# Patient Record
Sex: MALE | Race: WHITE | URBAN - METROPOLITAN AREA
[De-identification: names, ages, dates, MRNs, and addresses within clinical notes are randomized per-mention and may not be internally consistent; named-entity substitution may affect disease eponyms.]

---

## 2024-09-13 ENCOUNTER — TELEPHONE (OUTPATIENT)
Dept: CARDIOTHORACIC SURGERY | Facility: MEDICAL CENTER | Age: 64
End: 2024-09-13
Payer: COMMERCIAL

## 2024-09-13 ENCOUNTER — HOSPITAL ENCOUNTER (OUTPATIENT)
Dept: RADIOLOGY | Facility: MEDICAL CENTER | Age: 64
End: 2024-09-13
Payer: COMMERCIAL

## 2024-09-16 ENCOUNTER — TELEPHONE (OUTPATIENT)
Dept: CARDIOTHORACIC SURGERY | Facility: MEDICAL CENTER | Age: 64
End: 2024-09-16
Payer: COMMERCIAL

## 2024-09-18 ENCOUNTER — TELEPHONE (OUTPATIENT)
Dept: CARDIOTHORACIC SURGERY | Facility: MEDICAL CENTER | Age: 64
End: 2024-09-18
Payer: COMMERCIAL

## 2024-10-03 ENCOUNTER — TELEPHONE (OUTPATIENT)
Dept: CARDIOTHORACIC SURGERY | Facility: MEDICAL CENTER | Age: 64
End: 2024-10-03
Payer: COMMERCIAL

## 2024-10-22 ENCOUNTER — HOSPITAL ENCOUNTER (OUTPATIENT)
Dept: RADIOLOGY | Facility: MEDICAL CENTER | Age: 64
End: 2024-10-22
Payer: COMMERCIAL

## 2024-10-22 ENCOUNTER — HOSPITAL ENCOUNTER (OUTPATIENT)
Dept: RADIOLOGY | Facility: MEDICAL CENTER | Age: 64
End: 2024-10-22
Attending: INTERNAL MEDICINE
Payer: COMMERCIAL

## 2024-10-31 ENCOUNTER — OFFICE VISIT (OUTPATIENT)
Dept: CARDIOTHORACIC SURGERY | Facility: MEDICAL CENTER | Age: 64
End: 2024-10-31
Payer: COMMERCIAL

## 2024-10-31 VITALS
HEART RATE: 75 BPM | TEMPERATURE: 97.9 F | SYSTOLIC BLOOD PRESSURE: 132 MMHG | OXYGEN SATURATION: 97 % | HEIGHT: 67 IN | DIASTOLIC BLOOD PRESSURE: 82 MMHG | WEIGHT: 217 LBS | BODY MASS INDEX: 34.06 KG/M2

## 2024-10-31 DIAGNOSIS — I71.21 ANEURYSM OF ASCENDING AORTA WITHOUT RUPTURE (HCC): ICD-10-CM

## 2024-10-31 RX ORDER — FAMOTIDINE 20 MG/1
20 TABLET, FILM COATED ORAL
COMMUNITY

## 2024-10-31 RX ORDER — IBUPROFEN 200 MG
200 TABLET ORAL
COMMUNITY

## 2024-10-31 RX ORDER — PSYLLIUM HUSK 0.4 G
CAPSULE ORAL
COMMUNITY

## 2024-10-31 RX ORDER — MULTIVITAMIN,THERAPEUTIC
1 TABLET ORAL DAILY
COMMUNITY

## 2024-10-31 RX ORDER — LISINOPRIL 40 MG/1
1 TABLET ORAL DAILY
COMMUNITY
Start: 2024-08-05

## 2024-10-31 ASSESSMENT — ENCOUNTER SYMPTOMS
STRIDOR: 0
CONSTIPATION: 0
DOUBLE VISION: 0
DIAPHORESIS: 0
HALLUCINATIONS: 0
HEARTBURN: 0
SPEECH CHANGE: 0
MYALGIAS: 0
FLANK PAIN: 0
EYE PAIN: 0
PALPITATIONS: 0
SPUTUM PRODUCTION: 0
NAUSEA: 0
POLYDIPSIA: 0
WHEEZING: 0
VOMITING: 0
ORTHOPNEA: 0
WEIGHT LOSS: 0
FOCAL WEAKNESS: 0
HEMOPTYSIS: 0
HEADACHES: 0
BLURRED VISION: 0
EYE DISCHARGE: 0
SEIZURES: 0
BRUISES/BLEEDS EASILY: 0
DEPRESSION: 0
ABDOMINAL PAIN: 0
SORE THROAT: 0
COUGH: 0
WEAKNESS: 0
NECK PAIN: 0
CHILLS: 0
DIZZINESS: 0
FEVER: 0

## 2024-10-31 ASSESSMENT — LIFESTYLE VARIABLES: SUBSTANCE_ABUSE: 0

## 2024-11-07 ENCOUNTER — TELEPHONE (OUTPATIENT)
Dept: HEALTH INFORMATION MANAGEMENT | Facility: OTHER | Age: 64
End: 2024-11-07
Payer: COMMERCIAL

## 2024-11-07 ENCOUNTER — DOCUMENTATION (OUTPATIENT)
Dept: CARDIOLOGY | Facility: MEDICAL CENTER | Age: 64
End: 2024-11-07
Payer: COMMERCIAL

## 2024-12-02 ENCOUNTER — DOCUMENTATION (OUTPATIENT)
Dept: VASCULAR LAB | Facility: MEDICAL CENTER | Age: 64
End: 2024-12-02
Payer: COMMERCIAL

## 2024-12-03 NOTE — PROGRESS NOTES
Patient referred to vascular care  Unfortunately when respire schedulers patient refused to make an appointment.  Unless patient establishes with a face-to-face visit in our office will be unable to take part in care  Await further patient contact.  Pending further contact, we will defer all further management of vascular disease and its risk factors to PCP and/or referring MD.    Will ask nurse navigator to contact patient and see if they would be willing to reconsider    Michael J. Bloch, MD  Vascular Care    cc:     OSCAR Arrieta

## 2024-12-06 ENCOUNTER — TELEPHONE (OUTPATIENT)
Dept: VASCULAR LAB | Facility: MEDICAL CENTER | Age: 64
End: 2024-12-06
Payer: COMMERCIAL

## 2024-12-06 NOTE — TELEPHONE ENCOUNTER
I called pt, he states he does not want to establish care and is going to be meeting with his oncologist to discuss options. I did let him know if he changes his mind we are here and could even look at doing a VV.     Declines scheduling at this time.

## 2025-04-04 ENCOUNTER — HOSPITAL ENCOUNTER (OUTPATIENT)
Dept: RADIOLOGY | Facility: MEDICAL CENTER | Age: 65
End: 2025-04-04

## 2025-04-04 ENCOUNTER — APPOINTMENT (OUTPATIENT)
Dept: RADIOLOGY | Facility: MEDICAL CENTER | Age: 65
DRG: 287 | End: 2025-04-04
Attending: STUDENT IN AN ORGANIZED HEALTH CARE EDUCATION/TRAINING PROGRAM
Payer: COMMERCIAL

## 2025-04-04 ENCOUNTER — APPOINTMENT (OUTPATIENT)
Dept: CARDIOLOGY | Facility: MEDICAL CENTER | Age: 65
DRG: 287 | End: 2025-04-04
Payer: COMMERCIAL

## 2025-04-04 ENCOUNTER — HOSPITAL ENCOUNTER (INPATIENT)
Facility: MEDICAL CENTER | Age: 65
LOS: 2 days | DRG: 287 | End: 2025-04-06
Admitting: STUDENT IN AN ORGANIZED HEALTH CARE EDUCATION/TRAINING PROGRAM
Payer: COMMERCIAL

## 2025-04-04 DIAGNOSIS — M25.511 ACUTE PAIN OF RIGHT SHOULDER: ICD-10-CM

## 2025-04-04 DIAGNOSIS — I21.4 NSTEMI (NON-ST ELEVATED MYOCARDIAL INFARCTION) (HCC): ICD-10-CM

## 2025-04-04 PROBLEM — I10 HYPERTENSION: Status: ACTIVE | Noted: 2025-04-04

## 2025-04-04 PROBLEM — K21.9 GERD (GASTROESOPHAGEAL REFLUX DISEASE): Status: ACTIVE | Noted: 2025-04-04

## 2025-04-04 PROBLEM — C61 PROSTATE CANCER (HCC): Status: ACTIVE | Noted: 2025-04-04

## 2025-04-04 PROBLEM — E78.5 HYPERLIPIDEMIA LDL GOAL <70: Status: ACTIVE | Noted: 2025-04-04

## 2025-04-04 LAB
ALBUMIN SERPL BCP-MCNC: 4.2 G/DL (ref 3.2–4.9)
ALBUMIN/GLOB SERPL: 1.8 G/DL
ALP SERPL-CCNC: 91 U/L (ref 30–99)
ALT SERPL-CCNC: 27 U/L (ref 2–50)
ANION GAP SERPL CALC-SCNC: 12 MMOL/L (ref 7–16)
APPEARANCE UR: CLEAR
APTT PPP: 30.4 SEC (ref 24.7–36)
AST SERPL-CCNC: 22 U/L (ref 12–45)
BACTERIA #/AREA URNS HPF: NORMAL /HPF
BASOPHILS # BLD AUTO: 0.9 % (ref 0–1.8)
BASOPHILS # BLD: 0.07 K/UL (ref 0–0.12)
BILIRUB SERPL-MCNC: 0.5 MG/DL (ref 0.1–1.5)
BILIRUB UR QL STRIP.AUTO: NEGATIVE
BUN SERPL-MCNC: 17 MG/DL (ref 8–22)
CALCIUM ALBUM COR SERPL-MCNC: 8.9 MG/DL (ref 8.5–10.5)
CALCIUM SERPL-MCNC: 9.1 MG/DL (ref 8.5–10.5)
CASTS URNS QL MICRO: NORMAL /LPF (ref 0–2)
CHLORIDE SERPL-SCNC: 106 MMOL/L (ref 96–112)
CHOLEST SERPL-MCNC: 164 MG/DL (ref 100–199)
CK SERPL-CCNC: 172 U/L (ref 0–154)
CO2 SERPL-SCNC: 21 MMOL/L (ref 20–33)
COLOR UR: YELLOW
CORTIS SERPL-MCNC: 14.9 UG/DL (ref 0–23)
CREAT SERPL-MCNC: 1.12 MG/DL (ref 0.5–1.4)
EKG IMPRESSION: NORMAL
EOSINOPHIL # BLD AUTO: 0.04 K/UL (ref 0–0.51)
EOSINOPHIL NFR BLD: 0.5 % (ref 0–6.9)
EPITHELIAL CELLS 1715: NORMAL /HPF (ref 0–5)
ERYTHROCYTE [DISTWIDTH] IN BLOOD BY AUTOMATED COUNT: 45.7 FL (ref 35.9–50)
EST. AVERAGE GLUCOSE BLD GHB EST-MCNC: 128 MG/DL
GFR SERPLBLD CREATININE-BSD FMLA CKD-EPI: 73 ML/MIN/1.73 M 2
GLOBULIN SER CALC-MCNC: 2.3 G/DL (ref 1.9–3.5)
GLUCOSE SERPL-MCNC: 114 MG/DL (ref 65–99)
GLUCOSE UR STRIP.AUTO-MCNC: NEGATIVE MG/DL
HBA1C MFR BLD: 6.1 % (ref 4–5.6)
HCT VFR BLD AUTO: 43.2 % (ref 42–52)
HDLC SERPL-MCNC: 64 MG/DL
HGB BLD-MCNC: 14.2 G/DL (ref 14–18)
HOLDING TUBE BB 8507: NORMAL
IMM GRANULOCYTES # BLD AUTO: 0.03 K/UL (ref 0–0.11)
IMM GRANULOCYTES NFR BLD AUTO: 0.4 % (ref 0–0.9)
INR PPP: 1.04 (ref 0.87–1.13)
KETONES UR STRIP.AUTO-MCNC: 15 MG/DL
LACTATE SERPL-SCNC: 2.2 MMOL/L (ref 0.5–2)
LDLC SERPL CALC-MCNC: 82 MG/DL
LEUKOCYTE ESTERASE UR QL STRIP.AUTO: NEGATIVE
LV EJECT FRACT  99904: 65
LV EJECT FRACT MOD 2C 99903: 67.37
LV EJECT FRACT MOD 4C 99902: 70
LV EJECT FRACT MOD BP 99901: 69.83
LYMPHOCYTES # BLD AUTO: 1.9 K/UL (ref 1–4.8)
LYMPHOCYTES NFR BLD: 23.3 % (ref 22–41)
MAGNESIUM SERPL-MCNC: 2 MG/DL (ref 1.5–2.5)
MCH RBC QN AUTO: 28.5 PG (ref 27–33)
MCHC RBC AUTO-ENTMCNC: 32.9 G/DL (ref 32.3–36.5)
MCV RBC AUTO: 86.7 FL (ref 81.4–97.8)
MICRO URNS: ABNORMAL
MONOCYTES # BLD AUTO: 0.57 K/UL (ref 0–0.85)
MONOCYTES NFR BLD AUTO: 7 % (ref 0–13.4)
NEUTROPHILS # BLD AUTO: 5.54 K/UL (ref 1.82–7.42)
NEUTROPHILS NFR BLD: 67.9 % (ref 44–72)
NITRITE UR QL STRIP.AUTO: NEGATIVE
NRBC # BLD AUTO: 0 K/UL
NRBC BLD-RTO: 0 /100 WBC (ref 0–0.2)
NT-PROBNP SERPL IA-MCNC: 237 PG/ML (ref 0–125)
PH UR STRIP.AUTO: 7 [PH] (ref 5–8)
PHOSPHATE SERPL-MCNC: 3.6 MG/DL (ref 2.5–4.5)
PLATELET # BLD AUTO: 277 K/UL (ref 164–446)
PMV BLD AUTO: 10.4 FL (ref 9–12.9)
POTASSIUM SERPL-SCNC: 4.7 MMOL/L (ref 3.6–5.5)
PROCALCITONIN SERPL-MCNC: 0.05 NG/ML
PROT SERPL-MCNC: 6.5 G/DL (ref 6–8.2)
PROT UR QL STRIP: 30 MG/DL
PROTHROMBIN TIME: 13.6 SEC (ref 12–14.6)
RBC # BLD AUTO: 4.98 M/UL (ref 4.7–6.1)
RBC # URNS HPF: NORMAL /HPF (ref 0–2)
RBC UR QL AUTO: NEGATIVE
SODIUM SERPL-SCNC: 139 MMOL/L (ref 135–145)
SP GR UR STRIP.AUTO: >1.045
TRIGL SERPL-MCNC: 89 MG/DL (ref 0–149)
TROPONIN T SERPL-MCNC: 8 NG/L (ref 6–19)
UFH PPP CHRO-ACNC: 0.66 IU/ML
UROBILINOGEN UR STRIP.AUTO-MCNC: 1 EU/DL
WBC # BLD AUTO: 8.2 K/UL (ref 4.8–10.8)
WBC #/AREA URNS HPF: NORMAL /HPF

## 2025-04-04 PROCEDURE — 700111 HCHG RX REV CODE 636 W/ 250 OVERRIDE (IP)

## 2025-04-04 PROCEDURE — 83036 HEMOGLOBIN GLYCOSYLATED A1C: CPT

## 2025-04-04 PROCEDURE — 99291 CRITICAL CARE FIRST HOUR: CPT | Performed by: STUDENT IN AN ORGANIZED HEALTH CARE EDUCATION/TRAINING PROGRAM

## 2025-04-04 PROCEDURE — B2111ZZ FLUOROSCOPY OF MULTIPLE CORONARY ARTERIES USING LOW OSMOLAR CONTRAST: ICD-10-PCS | Performed by: INTERNAL MEDICINE

## 2025-04-04 PROCEDURE — 82533 TOTAL CORTISOL: CPT

## 2025-04-04 PROCEDURE — 700102 HCHG RX REV CODE 250 W/ 637 OVERRIDE(OP)

## 2025-04-04 PROCEDURE — 93454 CORONARY ARTERY ANGIO S&I: CPT | Mod: 26 | Performed by: INTERNAL MEDICINE

## 2025-04-04 PROCEDURE — 71045 X-RAY EXAM CHEST 1 VIEW: CPT

## 2025-04-04 PROCEDURE — 770020 HCHG ROOM/CARE - TELE (206)

## 2025-04-04 PROCEDURE — 81001 URINALYSIS AUTO W/SCOPE: CPT

## 2025-04-04 PROCEDURE — 85520 HEPARIN ASSAY: CPT

## 2025-04-04 PROCEDURE — B3101ZZ FLUOROSCOPY OF THORACIC AORTA USING LOW OSMOLAR CONTRAST: ICD-10-PCS | Performed by: INTERNAL MEDICINE

## 2025-04-04 PROCEDURE — 99223 1ST HOSP IP/OBS HIGH 75: CPT | Mod: 25 | Performed by: INTERNAL MEDICINE

## 2025-04-04 PROCEDURE — 85730 THROMBOPLASTIN TIME PARTIAL: CPT

## 2025-04-04 PROCEDURE — 82550 ASSAY OF CK (CPK): CPT

## 2025-04-04 PROCEDURE — 85610 PROTHROMBIN TIME: CPT

## 2025-04-04 PROCEDURE — 80053 COMPREHEN METABOLIC PANEL: CPT

## 2025-04-04 PROCEDURE — 93306 TTE W/DOPPLER COMPLETE: CPT | Mod: 26 | Performed by: INTERNAL MEDICINE

## 2025-04-04 PROCEDURE — 83880 ASSAY OF NATRIURETIC PEPTIDE: CPT

## 2025-04-04 PROCEDURE — A9270 NON-COVERED ITEM OR SERVICE: HCPCS | Performed by: INTERNAL MEDICINE

## 2025-04-04 PROCEDURE — 84484 ASSAY OF TROPONIN QUANT: CPT

## 2025-04-04 PROCEDURE — 84145 PROCALCITONIN (PCT): CPT

## 2025-04-04 PROCEDURE — A9270 NON-COVERED ITEM OR SERVICE: HCPCS | Performed by: STUDENT IN AN ORGANIZED HEALTH CARE EDUCATION/TRAINING PROGRAM

## 2025-04-04 PROCEDURE — 83605 ASSAY OF LACTIC ACID: CPT

## 2025-04-04 PROCEDURE — 700111 HCHG RX REV CODE 636 W/ 250 OVERRIDE (IP): Mod: JZ | Performed by: STUDENT IN AN ORGANIZED HEALTH CARE EDUCATION/TRAINING PROGRAM

## 2025-04-04 PROCEDURE — 93306 TTE W/DOPPLER COMPLETE: CPT

## 2025-04-04 PROCEDURE — 700102 HCHG RX REV CODE 250 W/ 637 OVERRIDE(OP): Performed by: STUDENT IN AN ORGANIZED HEALTH CARE EDUCATION/TRAINING PROGRAM

## 2025-04-04 PROCEDURE — 83735 ASSAY OF MAGNESIUM: CPT

## 2025-04-04 PROCEDURE — 36415 COLL VENOUS BLD VENIPUNCTURE: CPT

## 2025-04-04 PROCEDURE — 84100 ASSAY OF PHOSPHORUS: CPT

## 2025-04-04 PROCEDURE — 700102 HCHG RX REV CODE 250 W/ 637 OVERRIDE(OP): Performed by: INTERNAL MEDICINE

## 2025-04-04 PROCEDURE — 80061 LIPID PANEL: CPT

## 2025-04-04 PROCEDURE — 85025 COMPLETE CBC W/AUTO DIFF WBC: CPT

## 2025-04-04 PROCEDURE — A9270 NON-COVERED ITEM OR SERVICE: HCPCS

## 2025-04-04 PROCEDURE — 93005 ELECTROCARDIOGRAM TRACING: CPT | Mod: TC | Performed by: STUDENT IN AN ORGANIZED HEALTH CARE EDUCATION/TRAINING PROGRAM

## 2025-04-04 PROCEDURE — 700117 HCHG RX CONTRAST REV CODE 255: Performed by: INTERNAL MEDICINE

## 2025-04-04 PROCEDURE — 93567 NJX CAR CTH SPRVLV AORTGRPHY: CPT | Performed by: INTERNAL MEDICINE

## 2025-04-04 PROCEDURE — 99152 MOD SED SAME PHYS/QHP 5/>YRS: CPT | Performed by: INTERNAL MEDICINE

## 2025-04-04 PROCEDURE — 700101 HCHG RX REV CODE 250

## 2025-04-04 PROCEDURE — 99153 MOD SED SAME PHYS/QHP EA: CPT

## 2025-04-04 PROCEDURE — 93010 ELECTROCARDIOGRAM REPORT: CPT | Performed by: INTERNAL MEDICINE

## 2025-04-04 RX ORDER — MIDAZOLAM HYDROCHLORIDE 1 MG/ML
INJECTION INTRAMUSCULAR; INTRAVENOUS
Status: COMPLETED
Start: 2025-04-04 | End: 2025-04-04

## 2025-04-04 RX ORDER — METOPROLOL SUCCINATE 25 MG/1
25 TABLET, EXTENDED RELEASE ORAL
Status: DISCONTINUED | OUTPATIENT
Start: 2025-04-04 | End: 2025-04-06 | Stop reason: HOSPADM

## 2025-04-04 RX ORDER — LISINOPRIL 20 MG/1
40 TABLET ORAL DAILY
Status: DISCONTINUED | OUTPATIENT
Start: 2025-04-04 | End: 2025-04-06 | Stop reason: HOSPADM

## 2025-04-04 RX ORDER — MORPHINE SULFATE 4 MG/ML
2-4 INJECTION INTRAVENOUS
Status: DISCONTINUED | OUTPATIENT
Start: 2025-04-04 | End: 2025-04-05

## 2025-04-04 RX ORDER — ASPIRIN 81 MG/1
81 TABLET ORAL DAILY
Status: DISCONTINUED | OUTPATIENT
Start: 2025-04-05 | End: 2025-04-06 | Stop reason: HOSPADM

## 2025-04-04 RX ORDER — FAMOTIDINE 20 MG/1
20 TABLET, FILM COATED ORAL 2 TIMES DAILY
Status: DISCONTINUED | OUTPATIENT
Start: 2025-04-04 | End: 2025-04-06 | Stop reason: HOSPADM

## 2025-04-04 RX ORDER — HYDROMORPHONE HYDROCHLORIDE 1 MG/ML
0.5 INJECTION, SOLUTION INTRAMUSCULAR; INTRAVENOUS; SUBCUTANEOUS ONCE
Status: COMPLETED | OUTPATIENT
Start: 2025-04-04 | End: 2025-04-04

## 2025-04-04 RX ORDER — VERAPAMIL HYDROCHLORIDE 2.5 MG/ML
INJECTION INTRAVENOUS
Status: COMPLETED
Start: 2025-04-04 | End: 2025-04-04

## 2025-04-04 RX ORDER — LABETALOL HYDROCHLORIDE 5 MG/ML
10 INJECTION, SOLUTION INTRAVENOUS EVERY 4 HOURS PRN
Status: DISCONTINUED | OUTPATIENT
Start: 2025-04-04 | End: 2025-04-06 | Stop reason: HOSPADM

## 2025-04-04 RX ORDER — HYDROMORPHONE HYDROCHLORIDE 1 MG/ML
0.5 INJECTION, SOLUTION INTRAMUSCULAR; INTRAVENOUS; SUBCUTANEOUS
Status: COMPLETED | OUTPATIENT
Start: 2025-04-04 | End: 2025-04-05

## 2025-04-04 RX ORDER — PROCHLORPERAZINE EDISYLATE 5 MG/ML
5-10 INJECTION INTRAMUSCULAR; INTRAVENOUS EVERY 4 HOURS PRN
Status: DISCONTINUED | OUTPATIENT
Start: 2025-04-04 | End: 2025-04-06 | Stop reason: HOSPADM

## 2025-04-04 RX ORDER — PROMETHAZINE HYDROCHLORIDE 25 MG/1
12.5-25 TABLET ORAL EVERY 4 HOURS PRN
Status: DISCONTINUED | OUTPATIENT
Start: 2025-04-04 | End: 2025-04-06 | Stop reason: HOSPADM

## 2025-04-04 RX ORDER — ATORVASTATIN CALCIUM 40 MG/1
40 TABLET, FILM COATED ORAL EVERY EVENING
Status: DISCONTINUED | OUTPATIENT
Start: 2025-04-04 | End: 2025-04-06 | Stop reason: HOSPADM

## 2025-04-04 RX ORDER — NITROGLYCERIN 0.4 MG/1
0.4 TABLET SUBLINGUAL
Status: DISCONTINUED | OUTPATIENT
Start: 2025-04-04 | End: 2025-04-06 | Stop reason: HOSPADM

## 2025-04-04 RX ORDER — HEPARIN SODIUM 200 [USP'U]/100ML
INJECTION, SOLUTION INTRAVENOUS
Status: COMPLETED
Start: 2025-04-04 | End: 2025-04-04

## 2025-04-04 RX ORDER — PROMETHAZINE HYDROCHLORIDE 12.5 MG/1
12.5-25 SUPPOSITORY RECTAL EVERY 4 HOURS PRN
Status: DISCONTINUED | OUTPATIENT
Start: 2025-04-04 | End: 2025-04-06 | Stop reason: HOSPADM

## 2025-04-04 RX ORDER — HEPARIN SODIUM 1000 [USP'U]/ML
INJECTION, SOLUTION INTRAVENOUS; SUBCUTANEOUS
Status: COMPLETED
Start: 2025-04-04 | End: 2025-04-04

## 2025-04-04 RX ORDER — ONDANSETRON 2 MG/ML
4 INJECTION INTRAMUSCULAR; INTRAVENOUS EVERY 4 HOURS PRN
Status: DISCONTINUED | OUTPATIENT
Start: 2025-04-04 | End: 2025-04-06 | Stop reason: HOSPADM

## 2025-04-04 RX ORDER — ACETAMINOPHEN 325 MG/1
650 TABLET ORAL EVERY 6 HOURS PRN
Status: DISCONTINUED | OUTPATIENT
Start: 2025-04-04 | End: 2025-04-06 | Stop reason: HOSPADM

## 2025-04-04 RX ORDER — HEPARIN SODIUM 1000 [USP'U]/ML
2000 INJECTION, SOLUTION INTRAVENOUS; SUBCUTANEOUS PRN
Status: DISCONTINUED | OUTPATIENT
Start: 2025-04-04 | End: 2025-04-04

## 2025-04-04 RX ORDER — IPRATROPIUM BROMIDE AND ALBUTEROL SULFATE 2.5; .5 MG/3ML; MG/3ML
3 SOLUTION RESPIRATORY (INHALATION)
Status: DISCONTINUED | OUTPATIENT
Start: 2025-04-04 | End: 2025-04-06 | Stop reason: HOSPADM

## 2025-04-04 RX ORDER — ONDANSETRON 4 MG/1
4 TABLET, ORALLY DISINTEGRATING ORAL EVERY 4 HOURS PRN
Status: DISCONTINUED | OUTPATIENT
Start: 2025-04-04 | End: 2025-04-06 | Stop reason: HOSPADM

## 2025-04-04 RX ORDER — GUAIFENESIN/DEXTROMETHORPHAN 100-10MG/5
10 SYRUP ORAL EVERY 6 HOURS PRN
Status: DISCONTINUED | OUTPATIENT
Start: 2025-04-04 | End: 2025-04-06 | Stop reason: HOSPADM

## 2025-04-04 RX ORDER — HEPARIN SODIUM 5000 [USP'U]/100ML
0-30 INJECTION, SOLUTION INTRAVENOUS CONTINUOUS
Status: DISCONTINUED | OUTPATIENT
Start: 2025-04-04 | End: 2025-04-04

## 2025-04-04 RX ORDER — LIDOCAINE HYDROCHLORIDE 20 MG/ML
INJECTION, SOLUTION INFILTRATION; PERINEURAL
Status: COMPLETED
Start: 2025-04-04 | End: 2025-04-04

## 2025-04-04 RX ORDER — ATORVASTATIN CALCIUM 20 MG/1
20 TABLET, FILM COATED ORAL DAILY
Status: ON HOLD | COMMUNITY
Start: 2025-02-14 | End: 2025-04-06

## 2025-04-04 RX ORDER — VITAMIN B COMPLEX
1000 TABLET ORAL DAILY
Status: DISCONTINUED | OUTPATIENT
Start: 2025-04-04 | End: 2025-04-06 | Stop reason: HOSPADM

## 2025-04-04 RX ORDER — POLYETHYLENE GLYCOL 3350 17 G/17G
1 POWDER, FOR SOLUTION ORAL
Status: DISCONTINUED | OUTPATIENT
Start: 2025-04-04 | End: 2025-04-06 | Stop reason: HOSPADM

## 2025-04-04 RX ORDER — AMOXICILLIN 250 MG
2 CAPSULE ORAL EVERY EVENING
Status: DISCONTINUED | OUTPATIENT
Start: 2025-04-04 | End: 2025-04-06 | Stop reason: HOSPADM

## 2025-04-04 RX ADMIN — HEPARIN SODIUM 12 UNITS/KG/HR: 5000 INJECTION, SOLUTION INTRAVENOUS at 08:28

## 2025-04-04 RX ADMIN — LIDOCAINE HYDROCHLORIDE: 20 INJECTION, SOLUTION INFILTRATION; PERINEURAL at 13:54

## 2025-04-04 RX ADMIN — LISINOPRIL 40 MG: 20 TABLET ORAL at 08:14

## 2025-04-04 RX ADMIN — SENNOSIDES AND DOCUSATE SODIUM 2 TABLET: 50; 8.6 TABLET ORAL at 18:21

## 2025-04-04 RX ADMIN — FAMOTIDINE 20 MG: 20 TABLET, FILM COATED ORAL at 08:14

## 2025-04-04 RX ADMIN — NITROGLYCERIN 10 ML: 20 INJECTION INTRAVENOUS at 13:59

## 2025-04-04 RX ADMIN — HEPARIN SODIUM 2000 UNITS: 200 INJECTION, SOLUTION INTRAVENOUS at 13:54

## 2025-04-04 RX ADMIN — Medication 1000 UNITS: at 08:14

## 2025-04-04 RX ADMIN — IOHEXOL 70 ML: 350 INJECTION, SOLUTION INTRAVENOUS at 14:15

## 2025-04-04 RX ADMIN — VERAPAMIL HYDROCHLORIDE 2.5 MG: 2.5 INJECTION, SOLUTION INTRAVENOUS at 13:59

## 2025-04-04 RX ADMIN — HYDROMORPHONE HYDROCHLORIDE 0.5 MG: 1 INJECTION, SOLUTION INTRAMUSCULAR; INTRAVENOUS; SUBCUTANEOUS at 14:53

## 2025-04-04 RX ADMIN — METOPROLOL SUCCINATE 25 MG: 25 TABLET, EXTENDED RELEASE ORAL at 12:08

## 2025-04-04 RX ADMIN — MIDAZOLAM HYDROCHLORIDE 2 MG: 1 INJECTION, SOLUTION INTRAMUSCULAR; INTRAVENOUS at 14:00

## 2025-04-04 RX ADMIN — HYDROMORPHONE HYDROCHLORIDE 0.5 MG: 1 INJECTION, SOLUTION INTRAMUSCULAR; INTRAVENOUS; SUBCUTANEOUS at 11:45

## 2025-04-04 RX ADMIN — FENTANYL CITRATE 100 MCG: 50 INJECTION, SOLUTION INTRAMUSCULAR; INTRAVENOUS at 14:02

## 2025-04-04 RX ADMIN — ATORVASTATIN CALCIUM 40 MG: 40 TABLET, FILM COATED ORAL at 18:21

## 2025-04-04 RX ADMIN — HEPARIN SODIUM 5000 UNITS: 1000 INJECTION, SOLUTION INTRAVENOUS; SUBCUTANEOUS at 14:00

## 2025-04-04 RX ADMIN — FAMOTIDINE 20 MG: 20 TABLET, FILM COATED ORAL at 18:21

## 2025-04-04 RX ADMIN — HYDROMORPHONE HYDROCHLORIDE 0.5 MG: 1 INJECTION, SOLUTION INTRAMUSCULAR; INTRAVENOUS; SUBCUTANEOUS at 18:21

## 2025-04-04 RX ADMIN — HEPARIN SODIUM 12 UNITS/KG/HR: 5000 INJECTION, SOLUTION INTRAVENOUS at 08:45

## 2025-04-04 RX ADMIN — HYDROMORPHONE HYDROCHLORIDE 0.5 MG: 1 INJECTION, SOLUTION INTRAMUSCULAR; INTRAVENOUS; SUBCUTANEOUS at 21:42

## 2025-04-04 RX ADMIN — HYDROMORPHONE HYDROCHLORIDE 0.5 MG: 1 INJECTION, SOLUTION INTRAMUSCULAR; INTRAVENOUS; SUBCUTANEOUS at 08:18

## 2025-04-04 SDOH — ECONOMIC STABILITY: TRANSPORTATION INSECURITY
IN THE PAST 12 MONTHS, HAS LACK OF RELIABLE TRANSPORTATION KEPT YOU FROM MEDICAL APPOINTMENTS, MEETINGS, WORK OR FROM GETTING THINGS NEEDED FOR DAILY LIVING?: NO

## 2025-04-04 SDOH — ECONOMIC STABILITY: TRANSPORTATION INSECURITY
IN THE PAST 12 MONTHS, HAS THE LACK OF TRANSPORTATION KEPT YOU FROM MEDICAL APPOINTMENTS OR FROM GETTING MEDICATIONS?: NO

## 2025-04-04 ASSESSMENT — SOCIAL DETERMINANTS OF HEALTH (SDOH)

## 2025-04-04 ASSESSMENT — PAIN DESCRIPTION - PAIN TYPE
TYPE: ACUTE PAIN

## 2025-04-04 ASSESSMENT — ENCOUNTER SYMPTOMS
COUGH: 0
NAUSEA: 1
DIAPHORESIS: 1
VOMITING: 0
HEARTBURN: 1
NERVOUS/ANXIOUS: 0
ORTHOPNEA: 0
WEAKNESS: 1
SHORTNESS OF BREATH: 0
FEVER: 0
PALPITATIONS: 0
LOSS OF CONSCIOUSNESS: 0
MYALGIAS: 1
BRUISES/BLEEDS EASILY: 0
DEPRESSION: 0
DIARRHEA: 0
BLURRED VISION: 0
ABDOMINAL PAIN: 0
PND: 0
HEADACHES: 0
FOCAL WEAKNESS: 0
CHILLS: 0
HEARTBURN: 0
DOUBLE VISION: 0
NAUSEA: 0
DIZZINESS: 0

## 2025-04-04 ASSESSMENT — COGNITIVE AND FUNCTIONAL STATUS - GENERAL
SUGGESTED CMS G CODE MODIFIER MOBILITY: CJ
SUGGESTED CMS G CODE MODIFIER DAILY ACTIVITY: CH
CLIMB 3 TO 5 STEPS WITH RAILING: A LITTLE
DAILY ACTIVITIY SCORE: 24
MOBILITY SCORE: 22
TURNING FROM BACK TO SIDE WHILE IN FLAT BAD: A LITTLE

## 2025-04-04 ASSESSMENT — LIFESTYLE VARIABLES
CONSUMPTION TOTAL: NEGATIVE
EVER FELT BAD OR GUILTY ABOUT YOUR DRINKING: NO
TOTAL SCORE: 0
TOTAL SCORE: 0
HAVE PEOPLE ANNOYED YOU BY CRITICIZING YOUR DRINKING: NO
TOTAL SCORE: 0
DOES PATIENT WANT TO STOP DRINKING: NO
ON A TYPICAL DAY WHEN YOU DRINK ALCOHOL HOW MANY DRINKS DO YOU HAVE: 2
HOW MANY TIMES IN THE PAST YEAR HAVE YOU HAD 5 OR MORE DRINKS IN A DAY: 0
EVER HAD A DRINK FIRST THING IN THE MORNING TO STEADY YOUR NERVES TO GET RID OF A HANGOVER: NO
HAVE YOU EVER FELT YOU SHOULD CUT DOWN ON YOUR DRINKING: NO
AVERAGE NUMBER OF DAYS PER WEEK YOU HAVE A DRINK CONTAINING ALCOHOL: 2
SUBSTANCE_ABUSE: 0
ALCOHOL_USE: YES

## 2025-04-04 ASSESSMENT — PATIENT HEALTH QUESTIONNAIRE - PHQ9
SUM OF ALL RESPONSES TO PHQ9 QUESTIONS 1 AND 2: 0
1. LITTLE INTEREST OR PLEASURE IN DOING THINGS: NOT AT ALL
2. FEELING DOWN, DEPRESSED, IRRITABLE, OR HOPELESS: NOT AT ALL

## 2025-04-04 NOTE — ASSESSMENT & PLAN NOTE
Troponin I 121 at [facility]  Right shoulder pain  No PE seen on CT chest    Continue trend CE, EKG  Aspirin, statin  As needed nitro SL, morphine/Dilaudid  Check TTE    Initiated on heparin drip  -Titrate Xa level to achieve therapeutic goal  -Monitor for bleeding  Cardiology consulted, appreciate recommendations    4/5/2025  Cath 4/4  Non-obstructive coronary artery disease    MRI Right shoulder to r/o mets. Hx of Ca

## 2025-04-04 NOTE — H&P
Hospital Medicine History & Physical Note    Date of Service  4/4/2025    Primary Care Physician  Mike Yarbrough M.D.    Consultants  None    Code Status  Full Code    Chief Complaint  No chief complaint on file.  Right shoulder pain  weakness    History of Presenting Illness  Bunny Fox is a 64 y.o. male with history of hypertension, hyperlipidemia, prostate cancer with metastasis to bone on Lupron therapy who presented 4/4/2025 as a direct transfer from [place] emergency room for evaluation of NSTEMI.    Patient reported visiting [place] area to [place] in [place].  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  He reported having a long day of skin, at the end of the day, reported noticing pain in his right shoulder.  He reported he proceeded to go to dinner, however the pain in right shoulder recurred, then became diaphoretic, weak.  Ultimately EMS called by wife and patient was brought into the [place] [facility] emergency room for evaluation.    Workup from [place] ER included:  CBC: WBC 9.3, hemoglobin 14.8, hematocrit 44.2, platelet 283  CMP: Sodium 140, potassium 4.4, chloride 104, bicarb 19, anion gap 17, glucose 90, BUN 17, creatinine 1.04, calcium 9.4, magnesium 1.8, total bilirubin 0.3, alkaline phosphatase 106, AST 20, ALT 34, total protein 6.6, albumin 4.1, lipase 30    Troponin I 121    PT 10.2  INR 0.91  PTT 23.6    EKG no obvious ST elevations    CTA chest: No pulmonary embolism.  Ascending aortic aneurysm measuring up to 5 cm, no dissection or other acute aortic findings    Patient was given nitroglycerin 0.4 mg, aspirin 324 mg therapeutic Lovenox. Patient was transferred to [facility] as a direct admit for NSTEMI.  He was seen by me at [facility] arrival, admitted to medicine service for further evaluation and treatment.  No acute distress at time of evaluation.    I discussed the plan of care with patient, bedside RN, charge RN, and pharmacy.    Review of Systems  Review of Systems   Constitutional:  Positive for  diaphoresis and malaise/fatigue. Negative for chills and fever.   HENT:  Negative for hearing loss and tinnitus.    Eyes:  Negative for blurred vision and double vision.   Respiratory:  Negative for cough and shortness of breath.    Cardiovascular:  Negative for chest pain and palpitations.        Right shoulder pain   Gastrointestinal:  Positive for heartburn and nausea. Negative for abdominal pain and vomiting.   Genitourinary:  Negative for dysuria and urgency.   Musculoskeletal:  Positive for joint pain (right shoulder) and myalgias.   Skin:  Negative for itching and rash.   Neurological:  Positive for weakness. Negative for dizziness, focal weakness and headaches.   Endo/Heme/Allergies:  Negative for environmental allergies. Does not bruise/bleed easily.   Psychiatric/Behavioral:  Negative for depression and substance abuse.    All other systems reviewed and are negative.      Past Medical History   has no past medical history on file.  hypertension, hyperlipidemia, prostate cancer with metastasis to bone    Surgical History   has a past surgical history that includes urology surgery (4/24/2009).     Family History  family history is not on file.   Family history reviewed with patient. There is no family history that is pertinent to the chief complaint.     Social History   reports that he has quit smoking. His smoking use included cigarettes. He has never used smokeless tobacco. He reports current alcohol use. He reports that he does not use drugs.    Allergies  Allergies   Allergen Reactions    Ciprofloxacin Unspecified and Swelling     Other Reaction(s): Other    joints       Medications  Prior to Admission Medications   Prescriptions Last Dose Informant Patient Reported? Taking?   Multiple Vitamins-Minerals (ONCOVITE) Tab   Yes No   Sig: Take 1 Tablet by mouth every day.   famotidine (PEPCID) 20 MG Tab   Yes No   Sig: Take 20 mg by mouth.   ibuprofen (MOTRIN) 200 MG Tab   Yes No   Sig: Take 200 mg by mouth.  "  leuprolide acetate (LUPRON DEPOT 4 MONTH) 30 MG Kit   Yes No   Sig: Inject 30 mg into the shoulder, thigh, or buttocks one time.   lisinopril (PRINIVIL) 40 MG tablet   Yes No   Sig: Take 1 Tablet by mouth every day.   vitamin D (VITAMIN D-1000 MAX ST) 1000 UNIT Tab   Yes No      Facility-Administered Medications: None       Physical Exam  Temp:  [36.4 °C (97.5 °F)] 36.4 °C (97.5 °F)  Pulse:  [77] 77  Resp:  [17] 17  BP: (151)/(81) 151/81  SpO2:  [94 %] 94 %  Blood Pressure: (!) 151/81 (RN aware)   Temperature: 36.4 °C (97.5 °F)   Pulse: 77   Respiration: 17   Pulse Oximetry: 94 %       Physical Exam  Vitals and nursing note reviewed.   Constitutional:       General: He is not in acute distress.  HENT:      Head: Normocephalic and atraumatic.      Nose: Nose normal.      Mouth/Throat:      Mouth: Mucous membranes are moist.      Pharynx: Oropharynx is clear.   Eyes:      General: No scleral icterus.     Extraocular Movements: Extraocular movements intact.   Cardiovascular:      Rate and Rhythm: Normal rate and regular rhythm.      Pulses: Normal pulses.      Heart sounds:      No friction rub.   Pulmonary:      Effort: No respiratory distress.      Breath sounds: No stridor. Rales present. No wheezing.   Chest:      Chest wall: No tenderness.   Abdominal:      General: There is distension.      Tenderness: There is no abdominal tenderness. There is no guarding or rebound.   Musculoskeletal:         General: Normal range of motion.      Cervical back: Neck supple. No tenderness.      Right lower leg: No edema.      Left lower leg: No edema.   Skin:     General: Skin is warm and dry.      Capillary Refill: Capillary refill takes less than 2 seconds.   Neurological:      General: No focal deficit present.      Mental Status: He is alert and oriented to person, place, and time.   Psychiatric:         Mood and Affect: Mood normal.         Laboratory:          No results for input(s): \"ALTSGPT\", \"ASTSGOT\", \"ALKPHOSPHAT\", " "\"TBILIRUBIN\", \"DBILIRUBIN\", \"GAMMAGT\", \"AMYLASE\", \"LIPASE\", \"ALB\", \"PREALBUMIN\", \"GLUCOSE\" in the last 72 hours.      No results for input(s): \"NTPROBNP\" in the last 72 hours.      No results for input(s): \"TROPONINT\" in the last 72 hours.    Imaging:  EC-ECHOCARDIOGRAM COMPLETE W/O CONT    (Results Pending)   DX-CHEST-LIMITED (1 VIEW)    (Results Pending)       X-Ray:  I have personally reviewed the images and compared with prior images.  EKG:  I have personally reviewed the images and compared with prior images.    Assessment/Plan:  Justification for Admission Status  I anticipate this patient will require at least 2 midnights of hospitalization, therefore appropriate for inpatient status.      * NSTEMI (non-ST elevated myocardial infarction) (HCC)- (present on admission)  Assessment & Plan  Troponin I 121 at [place]  Right shoulder pain  No PE seen on CT chest    Continue trend CE, EKG  Aspirin, statin  As needed nitro SL, morphine/Dilaudid  Check TTE    Initiated on heparin drip  -Titrate Xa level to achieve therapeutic goal  -Monitor for bleeding    Consult cardiology in a.m.    Hypertension  Assessment & Plan  Continue lisinopril 40 mg    Hyperlipidemia LDL goal <70  Assessment & Plan  Continue atorvastatin  Target LDL below 70 or as low as tolerated    GERD (gastroesophageal reflux disease)  Assessment & Plan  pepcid    Prostate cancer (HCC)  Assessment & Plan  Outpatient follow-up        VTE prophylaxis: therapeutic anticoagulation with heparin drip    Patient is critically ill due to NSTEMI, concern for further clinical deterioration, high risk for cardiac arrest and death, needing close hemodynamic monitoring and urgent cardiology evaluation.    Critical care time: 42 minutes spent in chart review, medical management, coordinating care with patient/telemetry staff/RN/pharmacy/frequent reevaluation of patient's clinical response to treatment  "

## 2025-04-04 NOTE — PROCEDURES
Cardiac Catheterization report    4/4/2025  2:18 PM    Referring MD:     Indication for procedure: Ascending aortic aneurysm    Procedures performed:  Coronary arteriograms  Aortic root angiogram    Final impression:  Non-obstructive coronary artery disease    Findings:  1.  Left main coronary artery:  Normal.  2.  Left anterior descending artery: Luminal irregularities no significant disease  3.  Left circumflex coronary artery:  Luminal irregularities no significant disease, gives two marginal branches distally  4. Ramus intermedius: Normal  5.  Right coronary artery: Could not engage right coronary artery due to large aortic root, anterior takeoff despite using multiple catheters. Nonselective aortic root angiogram opacified right coronary artery which appears patent.  6.  Aortic root angiogram showed dilated aortic root, ascending aorta measures 52 mm.    EBL: <10 CC    Specimens: None    Procedure details:  The risks and benefits of cardiac catheterization and possible intervention were explained to the patient including death, heart attack, stroke, and emergency surgery.  The patient verbalized understanding and wished to proceed.  The patient was brought to the cardiac catheterization laboratory in the fasting state and prepped and draped in the usual sterile fashion.  Timeout was performed.  The right wrist was locally anesthetized with lidocaine and the right radial artery was cannulated with 5/6-Taiwanese equipment and standard radial cocktail was given.  Coronary angiography was performed using JL 5, AR-2, AL-1 diagnostic catheters.  Aortic root angiogram was performed using 6 Taiwanese pigtail catheter.      Once all the views were obtained, all wires and catheters were removed from the patient without difficulty.  A Vasc-Band was placed over the right radial artery and the radial artery sheath was removed without difficulty.      Complications:  None    Contrast: 70 cc    Sedation time:  I supervised  moderate sedation over a trained independent nursing staff,  Sedation Start time:  13:50   Sedation Stop time: 14:13      ROSEANNE Brooks  [facility] vascular [facility]

## 2025-04-04 NOTE — PROGRESS NOTES
[facility] DIRECT ADMISSION REPORT  Transferring facility: [facility]  Transferring physician: Dr. Arciniega    Chief complaint: Nstemi with shoulder pain  Pertinent history & patient course:   Patient is a 64-year-old male with past medical history of metastatic adenocarcinoma of the prostate traveling who presents due to right-sided shoulder pain that was atraumatic with initial troponin was normal, up trended to 121 and 1 hour and was provided aspirin and Lovenox.  EKG without ischemic changes, currently chest pain-free and wanted to transfer for potential NSTEMI and cardiac evaluation.  Pertinent imaging & lab results: See above  Consultants called prior to transfer and pertinent input from consultants: None  Code Status: Full per transferring provider, I personally verified with the transferring provider patient's code status and the transferring provider has confirmed this with the patient.  Reason for Transfer: NSTEMI and potential cardiology evaluation  Further work up or recommendations requested prior to transfer: None    Patient accepted for transfer: Yes  Accepting [facility] Facility: [facility] - Nursing to notify the Triage Coordinator in the [facility] via Voalte or Phone ext. [phone] when patient arrives to the unit. The Triage Coordinator will assign the admitting provider.    Consultants to be called upon arrival: Potentially cardiology in AM  Admission status: Inpatient.   Floor requested: telemetry      The admitting provider is the point of contact for questions or concerns regarding patient's care.

## 2025-04-04 NOTE — CARE PLAN
The patient is Watcher - Medium risk of patient condition declining or worsening    Shift Goals  Clinical Goals: pain control, safety, education  Patient Goals: pain control, rest    Progress made toward(s) clinical / shift goals:    Problem: Knowledge Deficit - Standard  Goal: Patient and family/care givers will demonstrate understanding of plan of care, disease process/condition, diagnostic tests and medications  Outcome: Progressing   Pt and family educated on POC, all questions answered in regards to disease process, treatment and diet. Pt and family verbalize understanding and voice no further questions at this time.   Problem: Hemodynamics  Goal: Patient's hemodynamics, fluid balance and neurologic status will be stable or improve  Outcome: Progressing   VSS at this time.   Problem: Pain - Standard  Goal: Alleviation of pain or a reduction in pain to the patient’s comfort goal  Outcome: Progressing   Pt receiving prn pain medication as ordered per MD. Pt also using distraction, ice, and heat to decrease pain.   Problem: Safety  Goal: Will remain free from falls  Outcome: Progressing   Fall risk assessed, fall precautions in place, bed alarm on, pt verbalizes understanding of fall risk and calls for assistance appropriately.

## 2025-04-04 NOTE — CONSULTS
CARDIOLOGY CONSULTATION NOTE      Reason for Consult: Ascending aortic aneurysm measuring approximately 5.5 to 5.7 cm in diameter.    Consulting Physician: BETH Kendrick    HPI:  Bunny Fox is a 64 y.o. male with ascending aortic aneurysm measuring approximately 5.5 to 5.7 cm in diameter, possible bicuspid aortic valve, coronary artery calcifications seen on chest CTs, hypertension, dyslipidemia, and prostate cancer with bone mets diagnosed around 2009 status post prostatectomy and radiation therapy maintained on Lupron who was transferred from [place] for possible non-STEMI.    He reports yesterday after skiing, he developed severe right shoulder pain.  It is pinpoint in location.  The pain has been severe up to 9-10 out of 10.  He denies any chest pain or pressure.  He denies any associated shortness of breath.  He reports his right hand started tingling and he was mildly nauseous.  He was shaky due to the pain.  The pain is not worse with palpation or with position.  It is not related to exertion.  Troponin at [place] was reportedly elevated at 121.  I do not have the reference range.  Troponin here is normal at 8.    The patient was seen by Dr. Arrieta in October 2024 for evaluation of his ascending aortic aneurysm.  He was recommended to undergo ascending aortic aneurysm repair.  The patient says he was surprised to hear this news and so he did not pursue surgery but is scheduled to have a second opinion at [facility] in 2 weeks.      History reviewed. No pertinent past medical history.    Social History     Socioeconomic History    Marital status:      Spouse name: Not on file    Number of children: Not on file    Years of education: Not on file    Highest education level: Not on file   Occupational History    Not on file   Tobacco Use    Smoking status: Former     Types: Cigarettes    Smokeless tobacco: Never   Vaping Use    Vaping status: Never Used   Substance and Sexual  Activity    Alcohol use: Yes     Comment: socially    Drug use: Never    Sexual activity: Not on file   Other Topics Concern    Not on file   Social History Narrative    Not on file     Social Drivers of Health     Financial Resource Strain: Not on File (8/24/2019)    Received from [employer]    Financial Resource Strain     Financial Resource Strain: 0   Food Insecurity: No Food Insecurity (4/4/2025)    Hunger Vital Sign     Worried About Running Out of Food in the Last Year: Never true     Ran Out of Food in the Last Year: Never true   Transportation Needs: No Transportation Needs (4/4/2025)    PRAPARE - Transportation     Lack of Transportation (Medical): No     Lack of Transportation (Non-Medical): No   Physical Activity: Not on File (8/24/2019)    Received from [employer]    Physical Activity     Physical Activity: 0   Stress: Not on File (8/24/2019)    Received from [employer]    Stress     Stress: 0   Social Connections: Not on File (8/24/2019)    Received from [employer]    Social Connections     Social Connections and Isolation: 0   Intimate Partner Violence: Not At Risk (4/4/2025)    Humiliation, Afraid, Rape, and Kick questionnaire     Fear of Current or Ex-Partner: No     Emotionally Abused: No     Physically Abused: No     Sexually Abused: No   Housing Stability: Low Risk  (4/4/2025)    Housing Stability Vital Sign     Unable to Pay for Housing in the Last Year: No     Number of Times Moved in the Last Year: 0     Homeless in the Last Year: No       Current Facility-Administered Medications   Medication Dose Frequency Provider Last Rate Last Admin    acetaminophen (Tylenol) tablet 650 mg  650 mg Q6HRS PRN Nguyễn Moran M.D.        labetalol (Normodyne/Trandate) injection 10 mg  10 mg Q4HRS PRHASMUKH Moran M.D.        ondansetron (Zofran) syringe/vial injection 4 mg  4 mg Q4HRS PRN Nguyễn Moran M.D.        ondansetron (Zofran ODT) dispertab 4 mg  4 mg Q4HRS PRHASMUKH Moran M.D.        promethazine (Phenergan) tablet  12.5-25 mg  12.5-25 mg Q4HRS PRN Nguyễn Moran M.D.        promethazine (Phenergan) suppository 12.5-25 mg  12.5-25 mg Q4HRS PRN Nguyễn Moran M.D.        prochlorperazine (Compazine) injection 5-10 mg  5-10 mg Q4HRS PRN Nguyễn Moran M.D.        guaiFENesin dextromethorphan (Robitussin DM) 100-10 MG/5ML syrup 10 mL  10 mL Q6HRS PRN Nguyễn Moran M.D.        [START ON 4/5/2025] aspirin EC tablet 81 mg  81 mg DAILY Nguyễn Moran M.D.        atorvastatin (Lipitor) tablet 40 mg  40 mg Q EVENING Nguyễn Moran M.D.        nitroglycerin (Nitrostat) tablet 0.4 mg  0.4 mg Q5 MIN PRN Nguyễn Moran M.D.        morphine 4 MG/ML injection 2-4 mg  2-4 mg Q5 MIN PRN Nguyễn Moran M.D.        heparin infusion 25,000 units in 500 mL 0.45% NACL  0-30 Units/kg/hr Continuous Nguyễn Moran M.D. 23.9 mL/hr at 04/04/25 0845 12 Units/kg/hr at 04/04/25 0845    heparin injection 2,000 Units  2,000 Units PRN Nguyễn Moran M.D.        HYDROmorphone (Dilaudid) injection 0.5 mg  0.5 mg Q3HRS PRN Nguyễn Moran M.D.        famotidine (Pepcid) tablet 20 mg  20 mg BID Nguyễn Moran M.D.   20 mg at 04/04/25 0814    lisinopril (Prinivil) tablet 40 mg  40 mg DAILY Nguyễn Moran M.D.   40 mg at 04/04/25 0814    vitamin D3 (Cholecalciferol) tablet 1,000 Units  1,000 Units DAILY Nguyễn Moran M.D.   1,000 Units at 04/04/25 0814    ipratropium-albuterol (DUONEB) nebulizer solution  3 mL Q4H PRN (RT) Nguyễn Moran M.D.        senna-docusate (Pericolace Or Senokot S) 8.6-50 MG per tablet 2 Tablet  2 Tablet Q EVENING TITO Mcgrath.P.RSLAVA        And    polyethylene glycol/lytes (Miralax) Packet 1 Packet  1 Packet QDAY PRN HAKAN McgrathPRobRBERTIN.       Last reviewed on 10/31/2024  2:03 PM by Danie Arrieta D.O.     Ciprofloxacin    History reviewed. No pertinent family history.    ROS:   Review of Systems   Constitutional:  Negative for chills and fever.   HENT:  Negative for congestion.    Respiratory:  Negative for cough and shortness of  "breath.    Cardiovascular:  Negative for chest pain, palpitations, orthopnea, leg swelling and PND.   Gastrointestinal:  Negative for abdominal pain and nausea.   Musculoskeletal:  Positive for joint pain and myalgias.   Skin:  Negative for rash.   Neurological:  Negative for dizziness and loss of consciousness.   Endo/Heme/Allergies:  Does not bruise/bleed easily.   Psychiatric/Behavioral:  Negative for depression. The patient is not nervous/anxious.        Physical Exam     Blood pressure (!) 156/84, pulse 77, temperature 36.4 °C (97.5 °F), temperature source Temporal, resp. rate 17, height 1.702 m (5' 7.01\"), weight 99.7 kg (219 lb 12.8 oz), SpO2 94%.    Constitutional: Appears well-developed.   HENT: Normocephalic and atraumatic. No scleral icterus.   Neck: No JVD present.   Cardiovascular: Normal rate and rhythm. Exam reveals no gallop and no friction rub. No murmur heard.   Pulmonary/Chest: CTAB no rales, rhonchi, or wheezes  Abdominal: S/NT/ND BS+   Musculoskeletal:  Pulses present. No atrophy. Strength normal.  Extremities: Exhibits no edema. No clubbing or cyanosis.   Skin: Skin is warm and dry.   Neuro: Non-focal, CN 2-12 intact grossly      Intake/Output Summary (Last 24 hours) at 4/4/2025 1003  Last data filed at 4/4/2025 0900  Gross per 24 hour   Intake 0 ml   Output --   Net 0 ml         EKG (4/4/2025):  I have personally reviewed the EKG this visit and discussed with the patient. It shows normal sinus rhythm with early R/S transition.  No acute ST-T wave abnormalities.    Chest CTA 2/4/2025  IMPRESSION:     1. Stable fusiform dilatation of the ascending thoracic aorta measuring   5.5 x 5.3 cm in AP by TV dimensions.   2. No suspicious pulmonary nodules.   3. Status post total prostatectomy. No residual soft tissue masses or   pelvic lesions to suggest residual or recurrent local regional disease.   4. Stable sclerotic lesion within the posterolateral right fifth rib.   5. Stable expansile lytic lesion " within the left posterior second rib.   6. Unchanged chronic anterior wedge compression deformity of T12.   7. Otherwise no new or aggressive bony lesions are identified.     Recent Labs     04/04/25 0712   WBC 8.2   RBC 4.98   HEMOGLOBIN 14.2   HEMATOCRIT 43.2   MCV 86.7   MCH 28.5   MCHC 32.9   RDW 45.7   PLATELETCT 277   MPV 10.4     Recent Labs     04/04/25 0712   SODIUM 139   POTASSIUM 4.7   CHLORIDE 106   CO2 21   GLUCOSE 114*   BUN 17   CREATININE 1.12   CALCIUM 9.1     Recent Labs     04/04/25 0712   APTT 30.4   INR 1.04         Recent Labs     04/04/25 0712   CPKTOTAL 172*     Recent Labs     04/04/25 0712   TRIGLYCERIDE 89   HDL 64   LDL 82         Imaging reviewed    Impressions:  1.  Ascending aortic aneurysm measuring approximately 5.5 cm in diameter  2.  Questionable bicuspid aortic valve.  3.  Right shoulder pain etiology unclear  4.  Hypertension  5.  Dyslipidemia  6.  Metastatic prostate cancer      Recommendations:  I explained to the patient that I do not think his right shoulder pain is cardiac in etiology -I will leave workup of his right shoulder pain to the hospitalist  However he has a large ascending aortic aneurysm and risk of ascending aortic aneurysm repair outweigh risk of rupture -patient reports he has a second opinion with cardiothoracic surgery at [facility] in 2 weeks  I recommend cardiac catheterization to evaluate for CAD prior to ascending aortic aneurysm repair -the risks, benefits, and alternatives to the procedure were discussed in detail with the patient who verbalizes understanding  Echocardiogram has been ordered and is pending to evaluate for possible bicuspid aortic valve as well as ascending aortic aneurysm  I will start him on metoprolol succinate 25 mg daily  Continue lisinopril and atorvastatin  DC heparin  Further care will be determined after cardiac catheterization    Addendum:  Cath this afternoon shows mild luminal irregularities.  Discussed post-cath  instructions.  Pt instructed to f/u with  [facility] for second opinion regarding her ascending aortic aneurysm per his request which he as scheduled in 2 weeks.    Cardiology will sign off.  Please contact me with any questions.     Discussed with the referring physician.    Please note that this dictation was created using voice recognition software. There may be errors I did not discover before finalizing the note.     Jasmina Encinas MD [facility]   Cardiologist  [facility]

## 2025-04-04 NOTE — PROGRESS NOTES
4 Eyes Skin Assessment Completed by ALIE Hermosillo and NEIDA Lara.    Head WDL  Ears WDL  Nose WDL  Mouth WDL  Neck WDL  Breast/Chest Red raised moles scattered   Shoulder Blades Redness and Blanching  Spine Redness and Blanching, red raised moles, and brown moles  (R) Arm/Elbow/Hand WDL  (L) Arm/Elbow/Hand WDL  Abdomen Red raised moles scattered  Groin WDL  Scrotum/Coccyx/Buttocks WDL  (R) Leg WDL  (L) Leg WDL  (R) Heel/Foot/Toe WDL  (L) Heel/Foot/Toe WDL          Devices In Places Tele Box, Blood Pressure Cuff, and Pulse Ox      Interventions In Place Pillows    Possible Skin Injury No    Pictures Uploaded Into Epic Yes  Wound Consult Placed N/A  RN Wound Prevention Protocol Ordered No

## 2025-04-04 NOTE — PROGRESS NOTES
Patient returned from cath lab. Patient is A&O4  and on room air . Monitor placed back on patient, monitor room notified. Confirmed placement with tech. TR band with 13 cc present on right wrist. Cath site soft and clean. Bilateral radial pulses present and equal. Post sedation vitals initiated.       1530 3 cc removed from TR band. 10 cc left No numbness, tingling. Bilateral radial pulses present. Cap refill less than 3 seconds bilaterally.     1545 3 cc removed from TR band. 7 cc left. No numbness, tingling. Bilateral radial pulses present. Cap refill less than 3 seconds bilaterally.     1600 3 cc removed from TR band. 4 cc remaining. No numbness, tingling. Bilateral radial pulses present. Cap refill less than 3 seconds bilaterally.     1615 3cc removed from TR band. 1 cc remaining. No numbness, tingling. Bilateral radial pulses present. Cap refill less than 3 seconds bilaterally.     1645 1 removed. No numbness, tingling. Bilateral radial pulses present. Cap refill less than 3 seconds bilaterally.     TR band removed. VSS at this time.

## 2025-04-04 NOTE — PROGRESS NOTES
I will be off duty and signed out of voalte at 3pm.  If you have any needs for this patient after 3pm, please reach out to the on call Yessica JACKSON.  Thank you!

## 2025-04-04 NOTE — PROGRESS NOTES
[facility] Medicine Daily Progress Note    Date of Service  4/4/2025    Chief Complaint  Bunny Fox is a 64 y.o. male admitted 4/4/2025 with right shoulder pain with diaphoretic     Hospital Course  Bunny Fox is a 64 y.o. male with history of hypertension, hyperlipidemia, prostate cancer with metastasis to bone on Lupron therapy who presented 4/4/2025 as a direct transfer from [place] emergency room for evaluation of NSTEMI.     Patient reported visiting [place] area to [facility].  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  He reported having a long day of skin, at the end of the day, reported noticing pain in his right shoulder.  He reported he proceeded to go to dinner, however the pain in right shoulder recurred, then became diaphoretic, weak.  Ultimately EMS called by wife and patient was brought into the [place] emergency room for evaluation.     Workup from [place] ER included:  CBC: WBC 9.3, hemoglobin 14.8, hematocrit 44.2, platelet 283  CMP: Sodium 140, potassium 4.4, chloride 104, bicarb 19, anion gap 17, glucose 90, BUN 17, creatinine 1.04, calcium 9.4, magnesium 1.8, total bilirubin 0.3, alkaline phosphatase 106, AST 20, ALT 34, total protein 6.6, albumin 4.1, lipase 30     Troponin I 121     PT 10.2  INR 0.91  PTT 23.6     EKG no obvious ST elevations     CTA chest: No pulmonary embolism.  Ascending aortic aneurysm measuring up to 5 cm, no dissection or other acute aortic findings     Patient was given nitroglycerin 0.4 mg, aspirin 324 mg therapeutic Lovenox. Patient was transferred to [facility] as a direct admit for NSTEMI.  He was seen by me at [facility] arrival, admitted to medicine service for further evaluation and treatment.  No acute distress at time of evaluation.    Interval Problem Update  4/4 afebrile, vitals stable-slightly hypertensive and on room air. Still complaining of right shoulder pain, but improved with pain medications. ECHO pending.  Trending troponins.  Cardiology consulted, appreciate recommendations.    Addendum: cardiology took patient for cath and found a clean cath.  Right shoulder pain determined to be unrelated to cardiac issues.  Cardiology signed off. Patient still having significant right shoulder pain, ordered MRI right shoulder.    I have discussed this patient's plan of care and discharge plan at IDT rounds today with Case Management, Nursing, Nursing leadership, and other members of the IDT team.    Consultants/Specialty  cardiology    Code Status  Full Code    Disposition  The patient is not medically cleared for discharge to home or a post-acute facility.  Anticipate discharge to: home with close outpatient follow-up    I have placed the appropriate orders for post-discharge needs.    Review of Systems  Review of Systems   Constitutional:  Positive for diaphoresis. Negative for chills, fever and malaise/fatigue.   Respiratory:  Negative for cough and shortness of breath.    Cardiovascular:  Negative for chest pain, palpitations and leg swelling.   Gastrointestinal:  Negative for abdominal pain, diarrhea, heartburn, nausea and vomiting.   Genitourinary:  Negative for dysuria, frequency and urgency.   Musculoskeletal:  Positive for joint pain.   Neurological:  Negative for dizziness and headaches.   All other systems reviewed and are negative.       Physical Exam  Temp:  [36.4 °C (97.5 °F)] 36.4 °C (97.5 °F)  Pulse:  [77] 77  Resp:  [17] 17  BP: (151-156)/(81-84) 156/84  SpO2:  [94 %] 94 %    Physical Exam  Vitals and nursing note reviewed.   Constitutional:       General: He is awake.      Appearance: Normal appearance. He is not ill-appearing.   HENT:      Head: Normocephalic and atraumatic.      Jaw: There is normal jaw occlusion.      Right Ear: Hearing normal.      Left Ear: Hearing normal.      Nose: Nose normal.      Mouth/Throat:      Lips: Pink.      Mouth: Mucous membranes are moist.   Eyes:      Extraocular Movements: Extraocular movements  intact.      Conjunctiva/sclera: Conjunctivae normal.      Pupils: Pupils are equal, round, and reactive to light.   Neck:      Vascular: No carotid bruit.   Cardiovascular:      Rate and Rhythm: Normal rate and regular rhythm.      Pulses: Normal pulses.      Heart sounds: Normal heart sounds, S1 normal and S2 normal.   Pulmonary:      Effort: Pulmonary effort is normal.      Breath sounds: Normal breath sounds and air entry. No stridor.   Abdominal:      General: Abdomen is protuberant. Bowel sounds are normal.      Palpations: Abdomen is soft.   Musculoskeletal:      Cervical back: Normal range of motion and neck supple.      Right lower leg: No edema.      Left lower leg: No edema.   Skin:     General: Skin is warm and dry.      Capillary Refill: Capillary refill takes less than 2 seconds.   Neurological:      General: No focal deficit present.      Mental Status: He is alert and oriented to person, place, and time. Mental status is at baseline.      Sensory: Sensation is intact.      Motor: Motor function is intact.   Psychiatric:         Attention and Perception: Attention and perception normal.         Mood and Affect: Mood and affect normal.         Speech: Speech normal.         Behavior: Behavior normal. Behavior is cooperative.         Fluids  No intake or output data in the 24 hours ending 04/04/25 0837     Laboratory  Recent Labs     04/04/25  0712   WBC 8.2   RBC 4.98   HEMOGLOBIN 14.2   HEMATOCRIT 43.2   MCV 86.7   MCH 28.5   MCHC 32.9   RDW 45.7   PLATELETCT 277   MPV 10.4     Recent Labs     04/04/25  0712   SODIUM 139   POTASSIUM 4.7   CHLORIDE 106   CO2 21   GLUCOSE 114*   BUN 17   CREATININE 1.12   CALCIUM 9.1     Recent Labs     04/04/25  0712   APTT 30.4   INR 1.04         Recent Labs     04/04/25  0712   TRIGLYCERIDE 89   HDL 64   LDL 82       Imaging  DX-CHEST-LIMITED (1 VIEW)   Final Result         1.  No acute cardiopulmonary disease.   2.  Atherosclerosis      EC-ECHOCARDIOGRAM COMPLETE  W/O CONT    (Results Pending)        Assessment/Plan  * NSTEMI (non-ST elevated myocardial infarction) ([facility])- (present on admission)  Assessment & Plan  Troponin I 121 at [place]  Right shoulder pain  No PE seen on CT chest    Continue trend CE, EKG  Aspirin, statin  As needed nitro SL, morphine/Dilaudid  Check TTE    Initiated on heparin drip  -Titrate Xa level to achieve therapeutic goal  -Monitor for bleeding  Cardiology consulted, appreciate recommendations    Hypertension  Assessment & Plan  Continue lisinopril 40 mg    GERD (gastroesophageal reflux disease)  Assessment & Plan  pepcid    Prostate cancer ([facility])  Assessment & Plan  Outpatient follow-up    Hyperlipidemia LDL goal <70  Assessment & Plan  Continue atorvastatin  Target LDL below 70 or as low as tolerated         VTE prophylaxis:   SCDs/TEDs   therapeutic anticoagulation with weight-based heparin gtt, pharmacy to adjust PRN      I have performed a physical exam and reviewed and updated ROS and Plan today (4/4/2025). In review of yesterday's note (4/3/2025), there are no changes except as documented above.

## 2025-04-04 NOTE — ASSESSMENT & PLAN NOTE
Still has sever right shoulder pain   I will check MRI Right shoulder to r/o mets given his Hx of metastatic prostate cancer

## 2025-04-04 NOTE — PROGRESS NOTES
Pharmacy Medication Reconciliation      ~Medication reconciliation updated and complete per patient   ~Allergies have been verified and updated   ~No oral ABX within the last 30 days  ~Is dispense history available in EPIC: yes  ~Patient home pharmacy :  [facility] [phone]      ~Anticoagulants (rivaroxaban, apixaban, edoxaban, dabigatran, warfarin, enoxaparin) taken in the last 14 days? No

## 2025-04-04 NOTE — PROGRESS NOTES
Report received from night shift RN, assumed care of pt. Pt A&Ox4. Plan of care discussed with pt, labs and chart reviewed. All needs met at this time. Tele box on. On room air. Call light within reach, bed locked and in lowest position. All fall precautions and hourly rounding in place.Care ongoing.

## 2025-04-04 NOTE — PROGRESS NOTES
Received report from Arnav Dominguez RN. Pt arrived to unit at 0635 via gurney escorted by flight EMT. Assessment complete. VSS. No signs of distress noted at this time. Tele monitor in place. Monitor room notified. Pt c/o pain 9/10. Fall precautions and appropriate signs in place. Pt oriented to unit routine, call light/phone system within reach. Personal belongings within reach. Pt educated regarding fall precautions. Bed alarm is on.  Pt denies any further needs at this time.

## 2025-04-04 NOTE — HOSPITAL COURSE
Bunny Fox is a 64 y.o. male with history of hypertension, hyperlipidemia, prostate cancer with metastasis to bone on Lupron therapy who presented 4/4/2025 as a direct transfer from [place] emergency room for evaluation of NSTEMI. Troponin 21. EKG with no ST/T abnormalities. CTA chest: No pulmonary embolism.  Ascending aortic aneurysm measuring up to 5 cm, no dissection or other acute aortic findings. Started on heparin drip. Cardiology consulted. Underwent cardiac catheterization demonstrating mild luminal irregularities. Patient instructed to f/u with [facility] for second opinion regarding her ascending aortic aneurysm per his request which he as scheduled in 2 weeks. MRI shoulder was unremarkable. Medications were optimized, patient improved clinically and was agreeable to discharge. Patient was determined satisfactory for discharge with appropriate follow up.

## 2025-04-05 PROBLEM — E66.811 CLASS 1 OBESITY WITHOUT SERIOUS COMORBIDITY WITH BODY MASS INDEX (BMI) OF 34.0 TO 34.9 IN ADULT: Status: ACTIVE | Noted: 2025-04-05

## 2025-04-05 PROBLEM — M25.511 RIGHT SHOULDER PAIN: Status: ACTIVE | Noted: 2025-04-05

## 2025-04-05 PROBLEM — D64.9 ANEMIA: Status: ACTIVE | Noted: 2025-04-05

## 2025-04-05 LAB
ALBUMIN SERPL BCP-MCNC: 4.1 G/DL (ref 3.2–4.9)
ALBUMIN/GLOB SERPL: 2 G/DL
ALP SERPL-CCNC: 89 U/L (ref 30–99)
ALT SERPL-CCNC: 22 U/L (ref 2–50)
ANION GAP SERPL CALC-SCNC: 12 MMOL/L (ref 7–16)
AST SERPL-CCNC: 18 U/L (ref 12–45)
BILIRUB SERPL-MCNC: 0.3 MG/DL (ref 0.1–1.5)
BUN SERPL-MCNC: 15 MG/DL (ref 8–22)
CALCIUM ALBUM COR SERPL-MCNC: 8.6 MG/DL (ref 8.5–10.5)
CALCIUM SERPL-MCNC: 8.7 MG/DL (ref 8.5–10.5)
CHLORIDE SERPL-SCNC: 107 MMOL/L (ref 96–112)
CO2 SERPL-SCNC: 20 MMOL/L (ref 20–33)
CREAT SERPL-MCNC: 0.96 MG/DL (ref 0.5–1.4)
ERYTHROCYTE [DISTWIDTH] IN BLOOD BY AUTOMATED COUNT: 47.2 FL (ref 35.9–50)
GFR SERPLBLD CREATININE-BSD FMLA CKD-EPI: 88 ML/MIN/1.73 M 2
GLOBULIN SER CALC-MCNC: 2.1 G/DL (ref 1.9–3.5)
GLUCOSE SERPL-MCNC: 139 MG/DL (ref 65–99)
HCT VFR BLD AUTO: 42.2 % (ref 42–52)
HGB BLD-MCNC: 13.7 G/DL (ref 14–18)
MCH RBC QN AUTO: 28.9 PG (ref 27–33)
MCHC RBC AUTO-ENTMCNC: 32.5 G/DL (ref 32.3–36.5)
MCV RBC AUTO: 89 FL (ref 81.4–97.8)
PLATELET # BLD AUTO: 255 K/UL (ref 164–446)
PMV BLD AUTO: 10.3 FL (ref 9–12.9)
POTASSIUM SERPL-SCNC: 4.5 MMOL/L (ref 3.6–5.5)
PROT SERPL-MCNC: 6.2 G/DL (ref 6–8.2)
RBC # BLD AUTO: 4.74 M/UL (ref 4.7–6.1)
SODIUM SERPL-SCNC: 139 MMOL/L (ref 135–145)
WBC # BLD AUTO: 9.2 K/UL (ref 4.8–10.8)

## 2025-04-05 PROCEDURE — 700111 HCHG RX REV CODE 636 W/ 250 OVERRIDE (IP): Mod: JZ | Performed by: PHYSICIAN ASSISTANT

## 2025-04-05 PROCEDURE — 36415 COLL VENOUS BLD VENIPUNCTURE: CPT

## 2025-04-05 PROCEDURE — A9270 NON-COVERED ITEM OR SERVICE: HCPCS | Performed by: STUDENT IN AN ORGANIZED HEALTH CARE EDUCATION/TRAINING PROGRAM

## 2025-04-05 PROCEDURE — 700111 HCHG RX REV CODE 636 W/ 250 OVERRIDE (IP): Mod: JZ | Performed by: STUDENT IN AN ORGANIZED HEALTH CARE EDUCATION/TRAINING PROGRAM

## 2025-04-05 PROCEDURE — 700111 HCHG RX REV CODE 636 W/ 250 OVERRIDE (IP): Mod: JZ | Performed by: HOSPITALIST

## 2025-04-05 PROCEDURE — A9270 NON-COVERED ITEM OR SERVICE: HCPCS | Performed by: HOSPITALIST

## 2025-04-05 PROCEDURE — 85027 COMPLETE CBC AUTOMATED: CPT

## 2025-04-05 PROCEDURE — 700102 HCHG RX REV CODE 250 W/ 637 OVERRIDE(OP)

## 2025-04-05 PROCEDURE — A9270 NON-COVERED ITEM OR SERVICE: HCPCS | Performed by: INTERNAL MEDICINE

## 2025-04-05 PROCEDURE — 700102 HCHG RX REV CODE 250 W/ 637 OVERRIDE(OP): Performed by: STUDENT IN AN ORGANIZED HEALTH CARE EDUCATION/TRAINING PROGRAM

## 2025-04-05 PROCEDURE — 770006 HCHG ROOM/CARE - MED/SURG/GYN SEMI*

## 2025-04-05 PROCEDURE — 700102 HCHG RX REV CODE 250 W/ 637 OVERRIDE(OP): Performed by: INTERNAL MEDICINE

## 2025-04-05 PROCEDURE — A9270 NON-COVERED ITEM OR SERVICE: HCPCS

## 2025-04-05 PROCEDURE — 700102 HCHG RX REV CODE 250 W/ 637 OVERRIDE(OP): Performed by: HOSPITALIST

## 2025-04-05 PROCEDURE — 80053 COMPREHEN METABOLIC PANEL: CPT

## 2025-04-05 PROCEDURE — 99233 SBSQ HOSP IP/OBS HIGH 50: CPT | Performed by: HOSPITALIST

## 2025-04-05 RX ORDER — HYDROMORPHONE HYDROCHLORIDE 1 MG/ML
1 INJECTION, SOLUTION INTRAMUSCULAR; INTRAVENOUS; SUBCUTANEOUS
Status: DISCONTINUED | OUTPATIENT
Start: 2025-04-05 | End: 2025-04-06 | Stop reason: HOSPADM

## 2025-04-05 RX ORDER — KETOROLAC TROMETHAMINE 15 MG/ML
15 INJECTION, SOLUTION INTRAMUSCULAR; INTRAVENOUS EVERY 6 HOURS PRN
Status: COMPLETED | OUTPATIENT
Start: 2025-04-05 | End: 2025-04-06

## 2025-04-05 RX ORDER — TRAMADOL HYDROCHLORIDE 50 MG/1
50 TABLET ORAL EVERY 6 HOURS PRN
Status: DISCONTINUED | OUTPATIENT
Start: 2025-04-05 | End: 2025-04-06 | Stop reason: HOSPADM

## 2025-04-05 RX ADMIN — HYDROMORPHONE HYDROCHLORIDE 0.5 MG: 1 INJECTION, SOLUTION INTRAMUSCULAR; INTRAVENOUS; SUBCUTANEOUS at 01:04

## 2025-04-05 RX ADMIN — TRAMADOL HYDROCHLORIDE 50 MG: 50 TABLET, COATED ORAL at 13:42

## 2025-04-05 RX ADMIN — FAMOTIDINE 20 MG: 20 TABLET, FILM COATED ORAL at 17:44

## 2025-04-05 RX ADMIN — HYDROMORPHONE HYDROCHLORIDE 1 MG: 1 INJECTION, SOLUTION INTRAMUSCULAR; INTRAVENOUS; SUBCUTANEOUS at 04:25

## 2025-04-05 RX ADMIN — ASPIRIN 81 MG: 81 TABLET, COATED ORAL at 05:55

## 2025-04-05 RX ADMIN — HYDROMORPHONE HYDROCHLORIDE 1 MG: 1 INJECTION, SOLUTION INTRAMUSCULAR; INTRAVENOUS; SUBCUTANEOUS at 13:42

## 2025-04-05 RX ADMIN — FAMOTIDINE 20 MG: 20 TABLET, FILM COATED ORAL at 04:24

## 2025-04-05 RX ADMIN — HYDROMORPHONE HYDROCHLORIDE 1 MG: 1 INJECTION, SOLUTION INTRAMUSCULAR; INTRAVENOUS; SUBCUTANEOUS at 18:53

## 2025-04-05 RX ADMIN — METOPROLOL SUCCINATE 25 MG: 25 TABLET, EXTENDED RELEASE ORAL at 04:24

## 2025-04-05 RX ADMIN — LISINOPRIL 40 MG: 20 TABLET ORAL at 04:24

## 2025-04-05 RX ADMIN — KETOROLAC TROMETHAMINE 15 MG: 15 INJECTION, SOLUTION INTRAMUSCULAR; INTRAVENOUS at 17:44

## 2025-04-05 RX ADMIN — Medication 1000 UNITS: at 04:24

## 2025-04-05 RX ADMIN — SENNOSIDES AND DOCUSATE SODIUM 2 TABLET: 50; 8.6 TABLET ORAL at 17:44

## 2025-04-05 RX ADMIN — HYDROMORPHONE HYDROCHLORIDE 1 MG: 1 INJECTION, SOLUTION INTRAMUSCULAR; INTRAVENOUS; SUBCUTANEOUS at 09:55

## 2025-04-05 RX ADMIN — ATORVASTATIN CALCIUM 40 MG: 40 TABLET, FILM COATED ORAL at 18:00

## 2025-04-05 ASSESSMENT — PAIN DESCRIPTION - PAIN TYPE
TYPE: ACUTE PAIN

## 2025-04-05 ASSESSMENT — ENCOUNTER SYMPTOMS
EYE PAIN: 0
CHILLS: 0
FEVER: 0
ABDOMINAL PAIN: 0
STRIDOR: 0
COUGH: 0
HEADACHES: 0
VOMITING: 0
DIAPHORESIS: 1
SHORTNESS OF BREATH: 0
FLANK PAIN: 0

## 2025-04-05 ASSESSMENT — FIBROSIS 4 INDEX: FIB4 SCORE: 0.96

## 2025-04-05 ASSESSMENT — PATIENT HEALTH QUESTIONNAIRE - PHQ9
1. LITTLE INTEREST OR PLEASURE IN DOING THINGS: NOT AT ALL
2. FEELING DOWN, DEPRESSED, IRRITABLE, OR HOPELESS: NOT AT ALL
SUM OF ALL RESPONSES TO PHQ9 QUESTIONS 1 AND 2: 0

## 2025-04-05 NOTE — PROGRESS NOTES
Report received from night shift RN, assumed care of pt. Pt A&Ox4. Plan of care discussed with pt, labs and chart reviewed. All needs met at this time. Tele box on. On room air. Call light within reach, bed locked and in lowest position. All fall precautions and hourly rounding in place. Care ongoing.

## 2025-04-05 NOTE — CARE PLAN
The patient is Stable - Low risk of patient condition declining or worsening    Shift Goals  Clinical Goals: pain control, safety, edu  Patient Goals: pain control    Progress made toward(s) clinical / shift goals:    Problem: Knowledge Deficit - Standard  Goal: Patient and family/care givers will demonstrate understanding of plan of care, disease process/condition, diagnostic tests and medications  4/5/2025 1653 by Sara Hilton R.N.  Outcome: Progressing  4/5/2025 1653 by Sara Hilton R.N.  Outcome: Progressing     Problem: Hemodynamics  Goal: Patient's hemodynamics, fluid balance and neurologic status will be stable or improve  Outcome: Progressing     Problem: Fluid Volume  Goal: Fluid volume balance will be maintained  4/5/2025 1653 by Sara Hilton R.N.  Outcome: Progressing  4/5/2025 1653 by Sara Hilton R.N.  Outcome: Progressing     Problem: Urinary - Renal Perfusion  Goal: Ability to achieve and maintain adequate renal perfusion and functioning will improve  Outcome: Progressing     Problem: Respiratory  Goal: Patient will achieve/maintain optimum respiratory ventilation and gas exchange  Outcome: Progressing     Problem: Mechanical Ventilation  Goal: Safe management of artificial airway and ventilation  Outcome: Progressing  Goal: Successful weaning off mechanical ventilator, spontaneously maintains adequate gas exchange  Outcome: Progressing  Goal: Patient will be able to express needs and understand communication  Outcome: Progressing     Problem: Physical Regulation  Goal: Diagnostic test results will improve  4/5/2025 1653 by Sara Hilton R.N.  Outcome: Progressing  4/5/2025 1653 by LAITH TubbsNRob  Outcome: Progressing  Goal: Signs and symptoms of infection will decrease  4/5/2025 1653 by Sara Hilton R.N.  Outcome: Progressing  4/5/2025 1653 by Sara Hilton R.N.  Outcome: Progressing     Problem: Pain - Standard  Goal: Alleviation of pain or a  reduction in pain to the patient’s comfort goal  4/5/2025 1653 by Sara Hilton, R.NRob  Outcome: Progressing  4/5/2025 1653 by SHAYE Tubbs.N.  Outcome: Progressing       Patient is not progressing towards the following goals:

## 2025-04-05 NOTE — PROGRESS NOTES
Pt arrived to unit. Patient is A&Ox 4, on RA, and bed alarm is on . Patient reporting a pain level of 5/10 in his right shoulder, pt states  the pain is worse with movement and sometimes he gets a sharp pain all of a sudden. Pt denies nausea, lightheadedness, dizziness, chest pain or SOB. Pt states the Dilaudid has been helping, but it doesn't last very long. Notified MD, MD ordered Tramadol and Toradol PRNs. Pt denies any other symptoms or needs at this time.  Call light within reach and bed in lowest position. Reinforced the need to call for assistance. Plan of care discussed and patient does not have any further needs at this time.

## 2025-04-05 NOTE — PROGRESS NOTES
[facility] Medicine Daily Progress Note    Date of Service  4/5/2025    Chief Complaint  Bunny Fox is a 64 y.o. male admitted 4/4/2025 with right shoulder pain with diaphoretic.      Hospital Course  Bunny Fox is a 64 y.o. male with history of hypertension, hyperlipidemia, prostate cancer with metastasis to bone on Lupron therapy who presented 4/4/2025 as a direct transfer from [place] emergency room for evaluation of NSTEMI.     Patient reported visiting [place] area to [facility].  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  He reported having a long day of skin, at the end of the day, reported noticing pain in his right shoulder.  He reported he proceeded to go to dinner, however the pain in right shoulder recurred, then became diaphoretic, weak.  Ultimately EMS called by wife and patient was brought into the [place] emergency room for evaluation.     Workup from [place] ER included:  CBC: WBC 9.3, hemoglobin 14.8, hematocrit 44.2, platelet 283  CMP: Sodium 140, potassium 4.4, chloride 104, bicarb 19, anion gap 17, glucose 90, BUN 17, creatinine 1.04, calcium 9.4, magnesium 1.8, total bilirubin 0.3, alkaline phosphatase 106, AST 20, ALT 34, total protein 6.6, albumin 4.1, lipase 30     Troponin I 121     PT 10.2  INR 0.91  PTT 23.6     EKG no obvious ST elevations     CTA chest: No pulmonary embolism.  Ascending aortic aneurysm measuring up to 5 cm, no dissection or other acute aortic findings     Patient was given nitroglycerin 0.4 mg, aspirin 324 mg therapeutic Lovenox. Patient was transferred to [facility] as a direct admit for NSTEMI.  He was seen by me at [facility] arrival, admitted to medicine service for further evaluation and treatment.  No acute distress at time of evaluation.    Cardiology took patient for cath and found a clean cath.  Right shoulder pain determined to be unrelated to cardiac issues.  Cardiology signed off. Patient still having significant right shoulder pain, ordered  MRI right shoulder.    Interval Problem Update  Heart rate 50s-60s.  Saturating well on room air.  Cardiac cath shows non obstructive cardiac disease   Patient still having sever right shoulder pain.    I am adjusting pain medications with the addition of Toradol and tramadol  Given his history of metastatic prostate cancer I will check MRI right hip with and without contrast to rule out other pathology including metastasis  Hb 13.7 today, no evidence of gross bleeding.  I will order a follow up H&H.   I have discussed this patient's plan of care and discharge plan at IDT rounds today with Case Management, Nursing, Nursing leadership, and other members of the IDT team.    Consultants/Specialty  cardiology    Code Status  Full Code    Disposition  Not medically cleared.  Still having severe pain.  Will require further imaging with MRI.  Can be transferred to medical floor.    Review of Systems  Review of Systems   Constitutional:  Positive for diaphoresis. Negative for chills, fever and malaise/fatigue.   Eyes:  Negative for pain.   Respiratory:  Negative for cough, shortness of breath and stridor.    Cardiovascular:  Negative for chest pain and leg swelling.   Gastrointestinal:  Negative for abdominal pain and vomiting.   Genitourinary:  Negative for flank pain.   Musculoskeletal:  Positive for joint pain.        Right shoulder    Neurological:  Negative for headaches.      Physical Exam  Temp:  [36.1 °C (97 °F)-36.7 °C (98.1 °F)] 36.4 °C (97.6 °F)  Pulse:  [55-82] 62  Resp:  [15-19] 19  BP: (130-170)/(77-98) 148/84  SpO2:  [89 %-97 %] 94 %    Physical Exam  Vitals and nursing note reviewed.   Constitutional:       General: He is awake.      Appearance: Normal appearance. He is not ill-appearing.   HENT:      Head: Normocephalic and atraumatic.      Jaw: There is normal jaw occlusion.      Right Ear: Hearing normal.      Left Ear: Hearing normal.      Nose: Nose normal.      Mouth/Throat:      Lips: Pink.       Mouth: Mucous membranes are moist.   Eyes:      Extraocular Movements: Extraocular movements intact.      Conjunctiva/sclera: Conjunctivae normal.      Pupils: Pupils are equal, round, and reactive to light.   Neck:      Vascular: No carotid bruit.   Cardiovascular:      Rate and Rhythm: Normal rate and regular rhythm.      Pulses: Normal pulses.      Heart sounds: Normal heart sounds, S1 normal and S2 normal.   Pulmonary:      Effort: Pulmonary effort is normal.      Breath sounds: Normal air entry.      Comments: Saturating well on RA    Abdominal:      General: Abdomen is protuberant. Bowel sounds are normal.      Palpations: Abdomen is soft.   Musculoskeletal:      Cervical back: Normal range of motion and neck supple.      Right lower leg: No edema.      Left lower leg: No edema.      Comments: Right shoulder pain is worse with movement      Skin:     General: Skin is warm and dry.      Capillary Refill: Capillary refill takes less than 2 seconds.   Neurological:      General: No focal deficit present.      Mental Status: He is alert and oriented to person, place, and time. Mental status is at baseline.      Sensory: Sensation is intact.      Motor: Motor function is intact.   Psychiatric:         Attention and Perception: Attention and perception normal.         Mood and Affect: Mood and affect normal.         Speech: Speech normal.         Behavior: Behavior normal. Behavior is cooperative.       Fluids    Intake/Output Summary (Last 24 hours) at 4/5/2025 1240  Last data filed at 4/5/2025 1000  Gross per 24 hour   Intake 1240 ml   Output 400 ml   Net 840 ml      Laboratory  Recent Labs     04/04/25  0712 04/05/25  0051   WBC 8.2 9.2   RBC 4.98 4.74   HEMOGLOBIN 14.2 13.7*   HEMATOCRIT 43.2 42.2   MCV 86.7 89.0   MCH 28.5 28.9   MCHC 32.9 32.5   RDW 45.7 47.2   PLATELETCT 277 255   MPV 10.4 10.3     Recent Labs     04/04/25  0712 04/05/25  0051   SODIUM 139 139   POTASSIUM 4.7 4.5   CHLORIDE 106 107   CO2 21  20   GLUCOSE 114* 139*   BUN 17 15   CREATININE 1.12 0.96   CALCIUM 9.1 8.7     Recent Labs     04/04/25  0712   APTT 30.4   INR 1.04         Recent Labs     04/04/25  0712   TRIGLYCERIDE 89   HDL 64   LDL 82     Imaging  EC-ECHOCARDIOGRAM COMPLETE W/O CONT   Final Result      DX-CHEST-LIMITED (1 VIEW)   Final Result         1.  No acute cardiopulmonary disease.   2.  Atherosclerosis      CL-LEFT HEART CATHETERIZATION WITH POSSIBLE INTERVENTION    (Results Pending)   MR-SHOULDER-WITH & W/O RIGHT    (Results Pending)      Assessment/Plan  * Chest and right Shoulder pain concerning for possible NSTEMI (non-ST elevated myocardial infarction) (HCC)- (present on admission)  Assessment & Plan  Troponin I 121 at [place]  Right shoulder pain  No PE seen on CT chest    Continue trend CE, EKG  Aspirin, statin  As needed nitro SL, morphine/Dilaudid  Check TTE    Initiated on heparin drip  -Titrate Xa level to achieve therapeutic goal  -Monitor for bleeding  Cardiology consulted, appreciate recommendations    4/5/2025  Cath 4/4  Non-obstructive coronary artery disease    MRI Right shoulder to r/o mets. Hx of Ca     Right shoulder pain- (present on admission)  Assessment & Plan  Still has sever right shoulder pain   No PE on CT chest   I will check MRI Right shoulder to r/o mets given his Hx of metastatic prostate cancer      Class 1 obesity without serious comorbidity with body mass index (BMI) of 34.0 to 34.9 in adult- (present on admission)  Assessment & Plan  At higher risk for atelectasis/hypoxia.  I will order continuous pulse oximetry  Emphasized incentive spirometry       Anemia- (present on admission)  Assessment & Plan  Hb 13.7 today, no evidence of gross bleeding.  I will order a follow up H&H.      Prostate cancer (HCC)- (present on admission)  Assessment & Plan  Still has sever right shoulder pain   I will check MRI Right shoulder to r/o mets given his Hx of metastatic prostate cancer      Hypertension- (present on  admission)  Assessment & Plan  Continue lisinopril 40 mg with hold parameters     GERD (gastroesophageal reflux disease)- (present on admission)  Assessment & Plan  Pepcid     Hyperlipidemia LDL goal <70- (present on admission)  Assessment & Plan  Continue atorvastatin  Cardiac diet        VTE prophylaxis: SCDs, ambulatory     Total time spent is 52 minutes.   This included review of patient chart since admission, face-to-face interview, physical examination, lab review and analysis.  In addition, I spoke with patient and nurse, charge nurse, pharmacy, case management during discharge planning IDT rounds.

## 2025-04-05 NOTE — PROGRESS NOTES
Bedside report received, Assume care.     2020A&O x 4, Able to make needs known.  Pain Assessment: 7/10 to right shoulder comfort measure offered . Medication not due per MAR.  Continuous tele and [place] monitoring in place. SPO2 1L 94%    Bed in low position and locked, pt resting comfortably now, call light with in reach, all needs met at this time. Interventions will be executed per plan of care.     236 reach out to Aprn about patient request for pain medication because patient pain still uncontrolled .     327 Aprn replied that she is busy right now and she will get back to me As soon as she can.

## 2025-04-05 NOTE — PROGRESS NOTES
Monitor Summary  Rhythm: SB/SR  Rate: 54-66  Ectopy: (O) PVC, (R, F) PAC  Measurements: .18/.07/.44  ---12 hr Chart Review---

## 2025-04-05 NOTE — ASSESSMENT & PLAN NOTE
Still has sever right shoulder pain   No PE on CT chest   I will check MRI Right shoulder to r/o mets given his Hx of metastatic prostate cancer

## 2025-04-05 NOTE — PROGRESS NOTES
4 Eyes Skin Assessment Completed by ALIE Ruiz and ALIE Stokes.    Head WDL  Ears WDL  Nose WDL  Mouth WDL  Neck WDL  Breast/Chest WDL  Shoulder Blades WDL  Spine WDL  (R) Arm/Elbow/Hand WDL  (L) Arm/Elbow/Hand WDL  Abdomen WDL  Groin WDL  Scrotum/Coccyx/Buttocks WDL  (R) Leg WDL  (L) Leg WDL  (R) Heel/Foot/Toe WDL  (L) Heel/Foot/Toe WDL          Devices In Places n/a      Interventions In Place Pillows    Possible Skin Injury No    Pictures Uploaded Into Epic N/A  Wound Consult Placed N/A  RN Wound Prevention Protocol Ordered No

## 2025-04-05 NOTE — CARE PLAN
The patient is Stable - Low risk of patient condition declining or worsening    Shift Goals  Clinical Goals: Pain control , safety and rest , update regarding MRI schedule  Patient Goals: Pain control     Check patient every two hours, monitor any hemodynamic changes assess patient pain and  give pain medication as needed.  Update patient regarding his procedure       Problem: Hemodynamics  Goal: Patient's hemodynamics, fluid balance and neurologic status will be stable or improve  Description: Target End Date:  Prior to discharge or change in level of careDocument on Assessment and I/O flowsheet templates1.  Monitor vital signs, pulse oximetry and cardiac monitor per provider order and/or policy2.  Maintain blood pressure per provider order3.  Hemodynamic monitoring per provider order4.  Manage IV fluids and IV infusions5.  Monitor intake and output6.  Daily weights per unit policy or provider order7.  Assess peripheral pulses and capillary refill8.  Assess color and body temperature9.  Position patient for maximum circulation/cardiac [id number]. Monitor for signs/symptoms of excessive [id number]. Assess mental status, restlessness and changes in level of [id number]. Monitor temperature and report fever or hypothermia to provider immediately. Consideration of targeted temperature management.  Outcome: Progressing     Problem: Fluid Volume  Goal: Fluid volume balance will be maintained  Description: Target End Date:  Prior to discharge or change in level of careDocument on I/O flowsheet1.  Monitor intake and output as ordered2.  Promote oral intake as appropriate3.  Report inadequate intake or output to physician4.  Administer IV therapy as ordered5.  Weights per provider order6.  Assess for signs and symptoms of bleeding7.  Monitor for signs of fluid overload (respiratory changes, edema, weight gain, increased abdominal girth)8.  Monitor of signs for inadequate fluid volume (poor skin turgor, dry mucous  membranes)9.  Instruct patient on adherence to fluid restrictions  Outcome: Progressing     Problem: Respiratory  Goal: Patient will achieve/maintain optimum respiratory ventilation and gas exchange  Description: Target End Date:  Prior to discharge or change in level of careDocument on Assessment flowsheet1.  Assess and monitor rate, rhythm, depth and effort of respiration2.  Breath sounds assessed qshift and/or as needed3.  Assess O2 saturation, administer/titrate oxygen as ordered4.  Position patient for maximum ventilatory efficiency5.  Turn, cough, and deep breath with splinting to improve effectiveness6.  Collaborate with RT to administer medication/treatments per order7.  Encourage use of incentive spirometer and encourage patient to cough after use and utilize splinting techniques if applicable8.  Airway suctioning9.  Monitor sputum production for changes in color, consistency and [id number]. Perform frequent oral [id number]. Alternate physical activity with rest periods  Outcome: Progressing

## 2025-04-06 ENCOUNTER — PHARMACY VISIT (OUTPATIENT)
Dept: PHARMACY | Facility: MEDICAL CENTER | Age: 65
End: 2025-04-06
Payer: COMMERCIAL

## 2025-04-06 ENCOUNTER — APPOINTMENT (OUTPATIENT)
Dept: RADIOLOGY | Facility: MEDICAL CENTER | Age: 65
DRG: 287 | End: 2025-04-06
Payer: COMMERCIAL

## 2025-04-06 VITALS
TEMPERATURE: 97.3 F | OXYGEN SATURATION: 93 % | DIASTOLIC BLOOD PRESSURE: 73 MMHG | RESPIRATION RATE: 13 BRPM | BODY MASS INDEX: 34.5 KG/M2 | HEART RATE: 59 BPM | HEIGHT: 67 IN | WEIGHT: 219.8 LBS | SYSTOLIC BLOOD PRESSURE: 135 MMHG

## 2025-04-06 LAB
ANION GAP SERPL CALC-SCNC: 9 MMOL/L (ref 7–16)
BUN SERPL-MCNC: 18 MG/DL (ref 8–22)
CALCIUM SERPL-MCNC: 8.8 MG/DL (ref 8.5–10.5)
CHLORIDE SERPL-SCNC: 106 MMOL/L (ref 96–112)
CO2 SERPL-SCNC: 24 MMOL/L (ref 20–33)
CREAT SERPL-MCNC: 0.87 MG/DL (ref 0.5–1.4)
ERYTHROCYTE [DISTWIDTH] IN BLOOD BY AUTOMATED COUNT: 46.5 FL (ref 35.9–50)
GFR SERPLBLD CREATININE-BSD FMLA CKD-EPI: 96 ML/MIN/1.73 M 2
GLUCOSE SERPL-MCNC: 99 MG/DL (ref 65–99)
HCT VFR BLD AUTO: 39.7 % (ref 42–52)
HGB BLD-MCNC: 12.5 G/DL (ref 14–18)
MCH RBC QN AUTO: 28.3 PG (ref 27–33)
MCHC RBC AUTO-ENTMCNC: 31.5 G/DL (ref 32.3–36.5)
MCV RBC AUTO: 90 FL (ref 81.4–97.8)
PLATELET # BLD AUTO: 228 K/UL (ref 164–446)
PMV BLD AUTO: 10.3 FL (ref 9–12.9)
POTASSIUM SERPL-SCNC: 4.3 MMOL/L (ref 3.6–5.5)
RBC # BLD AUTO: 4.41 M/UL (ref 4.7–6.1)
SODIUM SERPL-SCNC: 139 MMOL/L (ref 135–145)
WBC # BLD AUTO: 8.6 K/UL (ref 4.8–10.8)

## 2025-04-06 PROCEDURE — 36415 COLL VENOUS BLD VENIPUNCTURE: CPT

## 2025-04-06 PROCEDURE — 700102 HCHG RX REV CODE 250 W/ 637 OVERRIDE(OP): Performed by: STUDENT IN AN ORGANIZED HEALTH CARE EDUCATION/TRAINING PROGRAM

## 2025-04-06 PROCEDURE — 700117 HCHG RX CONTRAST REV CODE 255: Mod: JZ

## 2025-04-06 PROCEDURE — A9270 NON-COVERED ITEM OR SERVICE: HCPCS | Performed by: INTERNAL MEDICINE

## 2025-04-06 PROCEDURE — 700111 HCHG RX REV CODE 636 W/ 250 OVERRIDE (IP): Mod: JZ | Performed by: HOSPITALIST

## 2025-04-06 PROCEDURE — RXMED WILLOW AMBULATORY MEDICATION CHARGE: Performed by: INTERNAL MEDICINE

## 2025-04-06 PROCEDURE — 80048 BASIC METABOLIC PNL TOTAL CA: CPT

## 2025-04-06 PROCEDURE — 85027 COMPLETE CBC AUTOMATED: CPT

## 2025-04-06 PROCEDURE — 99239 HOSP IP/OBS DSCHRG MGMT >30: CPT | Performed by: INTERNAL MEDICINE

## 2025-04-06 PROCEDURE — 700102 HCHG RX REV CODE 250 W/ 637 OVERRIDE(OP): Performed by: INTERNAL MEDICINE

## 2025-04-06 PROCEDURE — A9579 GAD-BASE MR CONTRAST NOS,1ML: HCPCS | Mod: JZ

## 2025-04-06 PROCEDURE — 73223 MRI JOINT UPR EXTR W/O&W/DYE: CPT | Mod: RT

## 2025-04-06 PROCEDURE — A9270 NON-COVERED ITEM OR SERVICE: HCPCS | Performed by: STUDENT IN AN ORGANIZED HEALTH CARE EDUCATION/TRAINING PROGRAM

## 2025-04-06 PROCEDURE — 700111 HCHG RX REV CODE 636 W/ 250 OVERRIDE (IP): Mod: JZ | Performed by: PHYSICIAN ASSISTANT

## 2025-04-06 RX ORDER — METOPROLOL SUCCINATE 25 MG/1
25 TABLET, EXTENDED RELEASE ORAL DAILY
Qty: 30 TABLET | Refills: 1 | Status: SHIPPED | OUTPATIENT
Start: 2025-04-07

## 2025-04-06 RX ORDER — ASPIRIN 81 MG/1
81 TABLET ORAL DAILY
Qty: 100 TABLET | Refills: 1 | Status: SHIPPED | OUTPATIENT
Start: 2025-04-07

## 2025-04-06 RX ORDER — ATORVASTATIN CALCIUM 40 MG/1
40 TABLET, FILM COATED ORAL EVERY EVENING
Qty: 100 TABLET | Refills: 3 | Status: SHIPPED | OUTPATIENT
Start: 2025-04-06 | End: 2026-05-11

## 2025-04-06 RX ORDER — FAMOTIDINE 20 MG/1
20 TABLET, FILM COATED ORAL NIGHTLY
Qty: 30 TABLET | Refills: 0 | Status: SHIPPED | OUTPATIENT
Start: 2025-04-06 | End: 2025-05-06

## 2025-04-06 RX ORDER — TRAMADOL HYDROCHLORIDE 50 MG/1
50 TABLET ORAL EVERY 6 HOURS PRN
Qty: 12 TABLET | Refills: 0 | Status: SHIPPED | OUTPATIENT
Start: 2025-04-06 | End: 2025-04-09

## 2025-04-06 RX ADMIN — KETOROLAC TROMETHAMINE 15 MG: 15 INJECTION, SOLUTION INTRAMUSCULAR; INTRAVENOUS at 01:29

## 2025-04-06 RX ADMIN — ASPIRIN 81 MG: 81 TABLET, COATED ORAL at 05:07

## 2025-04-06 RX ADMIN — HYDROMORPHONE HYDROCHLORIDE 1 MG: 1 INJECTION, SOLUTION INTRAMUSCULAR; INTRAVENOUS; SUBCUTANEOUS at 07:35

## 2025-04-06 RX ADMIN — LISINOPRIL 40 MG: 20 TABLET ORAL at 05:08

## 2025-04-06 RX ADMIN — METOPROLOL SUCCINATE 25 MG: 25 TABLET, EXTENDED RELEASE ORAL at 05:07

## 2025-04-06 RX ADMIN — GADOTERIDOL 20 ML: 279.3 INJECTION, SOLUTION INTRAVENOUS at 09:42

## 2025-04-06 RX ADMIN — Medication 1000 UNITS: at 05:08

## 2025-04-06 RX ADMIN — FAMOTIDINE 20 MG: 20 TABLET, FILM COATED ORAL at 05:08

## 2025-04-06 ASSESSMENT — PAIN DESCRIPTION - PAIN TYPE
TYPE: ACUTE PAIN

## 2025-04-06 ASSESSMENT — PATIENT HEALTH QUESTIONNAIRE - PHQ9
2. FEELING DOWN, DEPRESSED, IRRITABLE, OR HOPELESS: NOT AT ALL
SUM OF ALL RESPONSES TO PHQ9 QUESTIONS 1 AND 2: 0
1. LITTLE INTEREST OR PLEASURE IN DOING THINGS: NOT AT ALL

## 2025-04-06 NOTE — DISCHARGE SUMMARY
Discharge Summary    CHIEF COMPLAINT ON ADMISSION  No chief complaint on file.      Reason for Admission  Cardiology (NSTEMI)     Admission Date  4/4/2025    CODE STATUS  Full Code    HPI & HOSPITAL COURSE  Bunny Fox is a 64 y.o. male with history of hypertension, hyperlipidemia, prostate cancer with metastasis to bone on Lupron therapy who presented 4/4/2025 as a direct transfer from [place] emergency room for evaluation of NSTEMI. Troponin 21. EKG with no ST/T abnormalities. CTA chest: No pulmonary embolism.  Ascending aortic aneurysm measuring up to 5 cm, no dissection or other acute aortic findings. Started on heparin drip. Cardiology consulted. Underwent cardiac catheterization demonstrating mild luminal irregularities. Patient instructed to f/u with [facility] for second opinion regarding her ascending aortic aneurysm per his request which he as scheduled in 2 weeks. MRI shoulder was unremarkable. Medications were optimized, patient improved clinically and was agreeable to discharge. Patient was determined satisfactory for discharge with appropriate follow up.    Therefore, he is discharged in fair and stable condition to home with close outpatient follow-up.    The patient met 2-midnight criteria for an inpatient stay at the time of discharge.    Discharge Date  4/6/2025    FOLLOW UP ITEMS POST DISCHARGE  Please follow up with PCP in 3-5 days for post hospitalization follow up and medication reconciliation.     Referral to ortho for shoulder pain    DISCHARGE DIAGNOSES  Principal Problem:    Chest and right Shoulder pain concerning for possible NSTEMI (non-ST elevated myocardial infarction) (HCC) (POA: Yes)  Active Problems:    Hyperlipidemia LDL goal <70 (POA: Yes)    Hypertension (POA: Yes)    Prostate cancer (HCC) (POA: Yes)    GERD (gastroesophageal reflux disease) (POA: Yes)    Right shoulder pain (POA: Yes)    Anemia (POA: Yes)    Class 1 obesity without serious comorbidity with body mass  index (BMI) of 34.0 to 34.9 in adult (POA: Yes)  Resolved Problems:    * No resolved hospital problems. *      FOLLOW UP  No future appointments.  No follow-up provider specified.    MEDICATIONS ON DISCHARGE     Medication List        START taking these medications        Instructions   Aspirin Low Dose 81 MG EC tablet  Start taking on: April 7, 2025  Generic drug: aspirin   Take 1 Tablet by mouth every day. Indications: Acute Heart Attack  Dose: 81 mg     metoprolol SR 25 MG Tb24  Start taking on: April 7, 2025  Commonly known as: Toprol XL   Take 1 Tablet by mouth every day.  Dose: 25 mg     traMADol 50 MG Tabs  Commonly known as: Ultram   Take 1 Tablet by mouth every 6 hours as needed for Severe Pain for up to 3 days.  Dose: 50 mg            CHANGE how you take these medications        Instructions   atorvastatin 40 MG Tabs  What changed:   medication strength  how much to take  when to take this  Commonly known as: Lipitor   Take 1 Tablet by mouth every evening. Indications: Heart Attack  Dose: 40 mg     famotidine 20 MG Tabs  What changed: when to take this  Commonly known as: Pepcid   Take 1 Tablet by mouth every evening for 30 days.  Dose: 20 mg            CONTINUE taking these medications        Instructions   ADVIL PM PO   Take 1 Capsule by mouth at bedtime as needed (sleep/pain).  Dose: 1 Capsule     CENTRUM ADULT PO   Take 1 Tablet by mouth every day.  Dose: 1 Tablet     leuprolide acetate 30 MG Kit  Commonly known as: Lupron Depot 4 Month   Inject 30 mg into the shoulder, thigh, or buttocks every 3 months.  Dose: 30 mg     lisinopril 40 MG tablet  Commonly known as: Prinivil   Take 1 Tablet by mouth every day.  Dose: 1 Tablet     Vitamin D-1000 Max St 25 MCG (1000 UT) Tabs  Generic drug: vitamin D             STOP taking these medications      Famotidine-Ca Carb-Mag Hydrox -165 MG Chew     ibuprofen 200 MG Tabs  Commonly known as: Motrin              Allergies  Allergies   Allergen Reactions     Ciprofloxacin Swelling and Unspecified     Other Reaction(s): Other    Joints swelled    Augmentin [Amoxicillin-Pot Clavulanate] Diarrhea and Nausea     Upset stomach       DIET  Orders Placed This Encounter   Procedures    Diet Order Diet: Cardiac     Standing Status:   Standing     Number of Occurrences:   1     Diet::   Cardiac [6]       LABORATORY  Lab Results   Component Value Date    SODIUM 139 04/06/2025    POTASSIUM 4.3 04/06/2025    CHLORIDE 106 04/06/2025    CO2 24 04/06/2025    GLUCOSE 99 04/06/2025    BUN 18 04/06/2025    CREATININE 0.87 04/06/2025        Lab Results   Component Value Date    WBC 8.6 04/06/2025    HEMOGLOBIN 12.5 (L) 04/06/2025    HEMATOCRIT 39.7 (L) 04/06/2025    PLATELETCT 228 04/06/2025        Total time of the discharge process exceeds 34 minutes.

## 2025-04-06 NOTE — CARE PLAN
The patient is Stable - Low risk of patient condition declining or worsening    Shift Goals  Clinical Goals: Pain management below tolerable level of 2, safe and effective discharge  Patient Goals: Pain free,  Family Goals: PAPITO no family member present    Progress made toward(s) clinical / shift goals:  on going    Patient is  progressing towards the following goals:  Problem: Knowledge Deficit - Standard  Goal: Patient and family/care givers will demonstrate understanding of plan of care, disease process/condition, diagnostic tests and medications  Description: Target End Date:  1-3 days or as soon as patient condition allowsDocument in Patient Education1.  Patient and family/caregiver oriented to unit, equipment, visitation policy and means for communicating concern2.  Complete/review Learning Assessment3.  Assess knowledge level of disease process/condition, treatment plan, diagnostic tests and medications4.  Explain disease process/condition, treatment plan, diagnostic tests and medications  Outcome: Progressing     Problem: Urinary - Renal Perfusion  Goal: Ability to achieve and maintain adequate renal perfusion and functioning will improve  Description: Target End Date:  Prior to discharge or change in level of careDocument on I/O and Assessment flowsheet1.  Urine output will remain greater than 0.5ml/Kg/HR2.  Monitor amount and/or characteristics of urine per order/policy. Specific gravity per order/policy3.  Assess signs and symptoms of renal dysfunction  Outcome: Progressing     Problem: Respiratory  Goal: Patient will achieve/maintain optimum respiratory ventilation and gas exchange  Description: Target End Date:  Prior to discharge or change in level of careDocument on Assessment flowsheet1.  Assess and monitor rate, rhythm, depth and effort of respiration2.  Breath sounds assessed qshift and/or as needed3.  Assess O2 saturation, administer/titrate oxygen as ordered4.  Position patient for maximum  ventilatory efficiency5.  Turn, cough, and deep breath with splinting to improve effectiveness6.  Collaborate with RT to administer medication/treatments per order7.  Encourage use of incentive spirometer and encourage patient to cough after use and utilize splinting techniques if applicable8.  Airway suctioning9.  Monitor sputum production for changes in color, consistency and [id number]. Perform frequent oral [id number]. Alternate physical activity with rest periods  Outcome: Progressing     Problem: Pain - Standard  Goal: Alleviation of pain or a reduction in pain to the patient’s comfort goal  Description: Target End Date:  Prior to discharge or change in level of careDocument on Vitals flowsheet1.  Document pain using the appropriate pain scale per order or unit policy2.  Educate and implement non-pharmacologic comfort measures (i.e. relaxation, distraction, massage, cold/heat therapy, etc.)3.  Pain management medications as ordered4.  Reassess pain after pain med administration per policy5.  If opiods administered assess patient's response to pain medication is appropriate per POSS sedation scale6.  Follow pain management plan developed in collaboration with patient and interdisciplinary team (including palliative care or pain specialists if applicable)  Outcome: Progressing     Problem: Safety  Goal: Will remain free from falls  Outcome: Progressing

## 2025-04-14 NOTE — Clinical Note
REFERRAL APPROVAL NOTICE         Sent on April 14, 2025                   Bunny Fox  [address] [address]  [facility] [address]                   Dear Mr. Fox,    After a careful review of the medical information and benefit coverage, [facility] has processed your referral. See below for additional details.    If applicable, you must be actively enrolled with your insurance for coverage of the authorized service. If you have any questions regarding your coverage, please contact your insurance directly.    REFERRAL INFORMATION   Referral #:  [id number]  Referred-To Department    Referred-By Provider:  Orthopedics    Francisco Frazier D.O.   [facility] Main [facility]uma      [address] [facility] [id number] [phone] [address] [place] [id number]  [phone]    Referral Start Date:  04/06/2025  Referral End Date:   04/06/2026             SCHEDULING  If you do not already have an appointment, please call [phone] to make an appointment.     MORE INFORMATION  If you do not already have a [employer] account, sign up at: [email or web address]  You can access your medical information, make appointments, see lab results, billing information, and more.  If you have questions regarding this referral, please contact  the [facility] Referrals department at:             [phone]. Monday - Friday 8:00AM - 5:00PM.     Sincerely,    [facility]

## 2025-04-15 NOTE — DOCUMENTATION QUERY
"                                                                         [facility]                                                                       Query Response Note      PATIENT:               MATTHEW SAEZ  ACCT #:                  [id number]  MRN:                     [id number]  :                      1960  ADMIT DATE:       2025 6:20 AM  DISCH DATE:        2025 12:48 PM  RESPONDING  PROVIDER #:        [id number]           QUERY TEXT:    Pt has documented \"possible\" NSTEMI in Discharge Summary following workup, can this finding be further clarified:    The patient's Clinical Indicators include:  Clinical Indicators:  In consult \"I do not think his right shoulder pain is cardiac in nature, leave workup of the right shoulder pain to the hospitalist.\"      Treatment:  Troponin at [place] was reportedly elevated at 121.  Troponin here is normal at 8.    Treated for his ascending aortic aneurysm with metoprolol - continued w/lisinopril and atorvastatin.    Had cardiac cath for ascending aortic aneurysm and coronoary arteriograms.  Finding was non-obstructive coronary artery disease.    Risk Factors:  Ascending aortic aneurysm.  Prostate cancer metastatic to bone on Lupron therapy.  Former smoker.    Leonora Gordon  HIM Coding  [email or web address]  Options provided:   -- NSTEMI is ruled in   -- NSTEMI is ruled out   -- Other explanation, (please specify other explanation)   -- Unable to determine      Query created by: Soumya Gordon on 2025 5:11 AM    RESPONSE TEXT:    NSTEMI is ruled out          Electronically signed by:  KAREN FRANCES MD 4/15/2025 7:13 AM              "